# Patient Record
Sex: FEMALE | Race: WHITE | Employment: FULL TIME | URBAN - METROPOLITAN AREA
[De-identification: names, ages, dates, MRNs, and addresses within clinical notes are randomized per-mention and may not be internally consistent; named-entity substitution may affect disease eponyms.]

---

## 2017-01-02 ENCOUNTER — OFFICE VISIT (OUTPATIENT)
Dept: URGENT CARE | Facility: CLINIC | Age: 37
End: 2017-01-02
Payer: COMMERCIAL

## 2017-01-02 VITALS
HEIGHT: 63 IN | WEIGHT: 184 LBS | OXYGEN SATURATION: 98 % | HEART RATE: 66 BPM | BODY MASS INDEX: 32.6 KG/M2 | DIASTOLIC BLOOD PRESSURE: 78 MMHG | TEMPERATURE: 98.2 F | SYSTOLIC BLOOD PRESSURE: 118 MMHG

## 2017-01-02 DIAGNOSIS — R05.9 COUGH: ICD-10-CM

## 2017-01-02 DIAGNOSIS — J40 BRONCHITIS: ICD-10-CM

## 2017-01-02 DIAGNOSIS — J02.9 PHARYNGITIS, UNSPECIFIED ETIOLOGY: ICD-10-CM

## 2017-01-02 LAB
FLUAV+FLUBV AG SPEC QL IA: NORMAL
INT CON NEG: NEGATIVE
INT CON NEG: NEGATIVE
INT CON POS: POSITIVE
INT CON POS: POSITIVE
S PYO AG THROAT QL: NORMAL

## 2017-01-02 PROCEDURE — 87804 INFLUENZA ASSAY W/OPTIC: CPT | Performed by: NURSE PRACTITIONER

## 2017-01-02 PROCEDURE — 99214 OFFICE O/P EST MOD 30 MIN: CPT | Performed by: NURSE PRACTITIONER

## 2017-01-02 PROCEDURE — 87880 STREP A ASSAY W/OPTIC: CPT | Performed by: NURSE PRACTITIONER

## 2017-01-02 RX ORDER — CODEINE PHOSPHATE AND GUAIFENESIN 10; 100 MG/5ML; MG/5ML
5 SOLUTION ORAL EVERY 6 HOURS PRN
Qty: 120 ML | Refills: 0 | Status: SHIPPED | OUTPATIENT
Start: 2017-01-02

## 2017-01-02 RX ORDER — AZITHROMYCIN 250 MG/1
TABLET, FILM COATED ORAL
Qty: 6 TAB | Refills: 0 | Status: SHIPPED | OUTPATIENT
Start: 2017-01-02 | End: 2018-02-10 | Stop reason: CLARIF

## 2017-01-02 ASSESSMENT — ENCOUNTER SYMPTOMS
TROUBLE SWALLOWING: 1
SPUTUM PRODUCTION: 1
SORE THROAT: 1
MYALGIAS: 1
FEVER: 1
SWOLLEN GLANDS: 1
CHILLS: 1
COUGH: 1
GASTROINTESTINAL NEGATIVE: 1

## 2017-01-02 NOTE — Clinical Note
January 2, 2017       Patient: Theresa Caldwell   YOB: 1980   Date of Visit: 1/2/2017         To Whom It May Concern:    It is my medical opinion that Theresa Caldwell may return to work on 1/4/17.    If you have any questions or concerns, please don't hesitate to call [phone]          Sincerely,          Cathey J Hamman, A.P.N.  Electronically Signed

## 2017-01-02 NOTE — MR AVS SNAPSHOT
"        Theresa Starr Javi   2017 9:00 AM   Office Visit   MRN: [id number]    Department:  [facility]   Dept Phone:  [phone]    Description:  Female : 1980   Provider:  Cathey J Hamman, A.P.N.           Reason for Visit     Pharyngitis Sore throat/ear pain/spiting up phlem and some dark red blood x4 days      Allergies as of 2017     No Known Allergies      You were diagnosed with     Bronchitis   [546106]       Pharyngitis, unspecified etiology   [8105253]       Cough   [786.2.ICD-9-CM]         Vital Signs     Blood Pressure Pulse Temperature Height Weight Body Mass Index    118/78 mmHg 66 36.8 °C (98.2 °F) 1.588 m (5' 2.5\") 83.462 kg (184 lb) 33.10 kg/m2    Oxygen Saturation Smoking Status                98% Never Smoker           Basic Information     Date Of Birth Sex Race Ethnicity Preferred Language    1980 Female White Unknown English      Health Maintenance        Date Due Completion Dates    IMM DTaP/Tdap/Td Vaccine (1 - Tdap) 2001 ---    PAP SMEAR 2003 ---    IMM INFLUENZA (1) 2016 ---            Results     POCT Rapid Strep A      Component    Rapid Strep Screen    NEG    Internal Control Positive    Positive    Internal Control Negative    Negative                        Current Immunizations     No immunizations on file.      Below and/or attached are the medications your provider expects you to take. Review all of your home medications and newly ordered medications with your provider and/or pharmacist. Follow medication instructions as directed by your provider and/or pharmacist. Please keep your medication list with you and share with your provider. Update the information when medications are discontinued, doses are changed, or new medications (including over-the-counter products) are added; and carry medication information at all times in the event of emergency situations     Allergies:  No Known Allergies          Medications  Valid as of: 2017 - " 10:25 AM    Generic Name Brand Name Tablet Size Instructions for use    Azithromycin (Tab) ZITHROMAX 250 MG 2 tabs by mouth day 1, 1 tab by mouth days 2-5        Azithromycin (Tab) ZITHROMAX 250 MG As directed with food        Azithromycin (Tab) ZITHROMAX 250 MG Take 2 pills by mouth on day one, take 1 pill by mouth on days 2-5        Cetirizine HCl (Tab) ZYRTEC 10 MG Take 10 mg by mouth every day.        Fluticasone Propionate (Suspension) FLONASE 50 MCG/ACT Spray 2 Sprays in nose every day. Each Nostril        Guaifenesin-Codeine (Solution) ROBITUSSIN -10 mg/5mL Take 5 mL by mouth every 6 hours as needed.        Hydrocod Polst-Chlorphen Polst (Liquid CR) TUSSIONEX 10-8 MG/5ML Take 5 mL by mouth every 12 hours as needed for Cough or Rhinitis.        Hydrocod Polst-Chlorphen Polst (Suspension Extended Release) TUSSIONEX 10-8 MG/5ML Take 5 mL by mouth every 12 hours.        Levonorgestrel-Ethinyl Estrad (Tab) NORDETTE 0.15-30 MG-MCG Take 1 Tab by mouth every day.        RaNITidine HCl (Tab) ZANTAC 150 MG Take 150 mg by mouth 2 times a day.        .                 Medicines prescribed today were sent to:     [facility] #[id number] - [place] - [address]    [address] [place] [address]    Phone: [phone] Fax: [phone]    Open 24 Hours?: No      Medication refill instructions:       If your prescription bottle indicates you have medication refills left, it is not necessary to call your provider’s office. Please contact your pharmacy and they will refill your medication.    If your prescription bottle indicates you do not have any refills left, you may request refills at any time through one of the following ways: The online [employer] system (except Urgent Care), by calling your provider’s office, or by asking your pharmacy to contact your provider’s office with a refill request. Medication refills are processed only during regular business hours and may not be available until the next business  day. Your provider may request additional information or to have a follow-up visit with you prior to refilling your medication.   *Please Note: Medication refills are assigned a new Rx number when refilled electronically. Your pharmacy may indicate that no refills were authorized even though a new prescription for the same medication is available at the pharmacy. Please request the medicine by name with the pharmacy before contacting your provider for a refill.           MyChart Status: Patient Declined

## 2017-01-02 NOTE — PROGRESS NOTES
Subjective:      Theresa Caldwell is a 36 y.o. female who presents with Pharyngitis    Past Medical History   Diagnosis Date   • Urticaria, chronic    • Other specified disorder of intestines      constipation/ irregular     Social History     Social History   • Marital Status: Single     Spouse Name: N/A   • Number of Children: N/A   • Years of Education: N/A     Occupational History   • Not on file.     Social History Main Topics   • Smoking status: Never Smoker    • Smokeless tobacco: Never Used   • Alcohol Use: Yes      Comment: rare   • Drug Use: No   • Sexual Activity:     Partners: Male     Other Topics Concern   • Not on file     Social History Narrative     Family History   Problem Relation Age of Onset   • Lung Disease Maternal Grandmother    • Heart Disease Paternal Grandfather      Allergies: Review of patient's allergies indicates no known allergies.     Current Outpatient Prescriptions on File Prior to Visit   Medication Sig Dispense Refill   • Hydrocod Polst-CPM Polst ER (TUSSIONEX) 10-8 MG/5ML Suspension Extended Release Take 5 mL by mouth every 12 hours. 60 mL 0   • azithromycin (ZITHROMAX) 250 MG Tab As directed with food 6 Tab 0   • hydrocod polst-chlorphen polst (TUSSIONEX) 10-8 MG/5ML LQCR Take 5 mL by mouth every 12 hours as needed for Cough or Rhinitis. 140 mL 0   • azithromycin (ZITHROMAX) 250 MG TABS 2 tabs by mouth day 1, 1 tab by mouth days 2-5 6 Tab 0   • fluticasone (FLONASE) 50 MCG/ACT nasal spray Spray 2 Sprays in nose every day. Each Nostril 1 Bottle 0   • ranitidine (ZANTAC) 150 MG TABS Take 150 mg by mouth 2 times a day.     • cetirizine (ZYRTEC) 10 MG TABS Take 10 mg by mouth every day.     • levonorgestrel-ethinyl estradiol (GINA-28) 0.15-30 MG-MCG per tablet Take 1 Tab by mouth every day.       No current facility-administered medications on file prior to visit.     Past Surgical History   Procedure Laterality Date   • Tonsillectomy and adenoidectomy       as a child   •  "Cervical conization     • Eye surgery  2009     lasik bilat   • Dental extraction(s)       wisdom teeth   • Ventral hernia repair  6/16/2011     Performed by CARIN LEIVA at SURGERY [facility]               Pharyngitis   This is a new problem. Episode onset: 3 days ago  The problem has been gradually worsening. Neither side of throat is experiencing more pain than the other. There has been no fever. The pain is moderate. Associated symptoms include congestion, coughing, swollen glands and trouble swallowing. She has had no exposure to strep or mono. She has tried nothing for the symptoms. The treatment provided no relief.       Review of Systems   Constitutional: Positive for fever, chills and malaise/fatigue.   HENT: Positive for congestion, sore throat and trouble swallowing.    Respiratory: Positive for cough and sputum production.    Gastrointestinal: Negative.    Genitourinary: Negative.    Musculoskeletal: Positive for myalgias.   Skin: Negative.        All other systems reviewed and are negative      Objective:     /78 mmHg  Pulse 66  Temp(Src) 36.8 °C (98.2 °F)  Ht 1.588 m (5' 2.5\")  Wt 83.462 kg (184 lb)  BMI 33.10 kg/m2  SpO2 98%     Physical Exam   Constitutional: She is oriented to person, place, and time. She appears well-developed and well-nourished. No distress.   HENT:   Head: Normocephalic.   Right Ear: External ear normal.   Left Ear: External ear normal.   Oropharynx red    Eyes: Conjunctivae and EOM are normal. Pupils are equal, round, and reactive to light. Right eye exhibits no discharge. Left eye exhibits no discharge.   Neck: Normal range of motion. Neck supple.   Cardiovascular: Normal rate and regular rhythm.    Pulmonary/Chest: Effort normal and breath sounds normal.   Musculoskeletal: Normal range of motion.   Lymphadenopathy:     She has cervical adenopathy.   Neurological: She is alert and oriented to person, place, and time.   Skin: Skin is warm and dry. She is not " diaphoretic.   Psychiatric: She has a normal mood and affect. Her behavior is normal.   Vitals reviewed.    poct strep: negative     poct influenza: negative           Assessment/Plan:   Bronchitis  Pharyngitis  Cough   -zithroamx   -cheratussin   -tylenol/motrin PRN   -follow up ifs ymptoms persist or wrosen   There are no diagnoses linked to this encounter.

## 2018-02-10 ENCOUNTER — OFFICE VISIT (OUTPATIENT)
Dept: URGENT CARE | Facility: CLINIC | Age: 38
End: 2018-02-10

## 2018-02-10 VITALS
BODY MASS INDEX: 30.12 KG/M2 | HEIGHT: 63 IN | WEIGHT: 170 LBS | SYSTOLIC BLOOD PRESSURE: 110 MMHG | OXYGEN SATURATION: 97 % | TEMPERATURE: 98.2 F | DIASTOLIC BLOOD PRESSURE: 80 MMHG | HEART RATE: 82 BPM

## 2018-02-10 DIAGNOSIS — J01.11 ACUTE RECURRENT FRONTAL SINUSITIS: ICD-10-CM

## 2018-02-10 PROCEDURE — 99214 OFFICE O/P EST MOD 30 MIN: CPT | Performed by: PHYSICIAN ASSISTANT

## 2018-02-10 RX ORDER — AZITHROMYCIN 250 MG/1
TABLET, FILM COATED ORAL
Qty: 6 TAB | Refills: 0 | Status: SHIPPED | OUTPATIENT
Start: 2018-02-10

## 2018-02-10 RX ORDER — CODEINE PHOSPHATE AND GUAIFENESIN 10; 100 MG/5ML; MG/5ML
5 SOLUTION ORAL EVERY 4 HOURS PRN
Qty: 120 ML | Refills: 0 | Status: SHIPPED | OUTPATIENT
Start: 2018-02-10 | End: 2018-02-20

## 2018-02-10 ASSESSMENT — ENCOUNTER SYMPTOMS
SPUTUM PRODUCTION: 0
WHEEZING: 0
SORE THROAT: 1
SHORTNESS OF BREATH: 0
COUGH: 1
SINUS PAIN: 1
PALPITATIONS: 0
SINUS PRESSURE: 1
HOARSE VOICE: 1
CHILLS: 0
FEVER: 0
HEADACHES: 1
HEMOPTYSIS: 0

## 2018-02-10 ASSESSMENT — PAIN SCALES - GENERAL: PAINLEVEL: 6=MODERATE PAIN

## 2018-02-10 NOTE — PROGRESS NOTES
Subjective:      Theresa Caldwell is a 37 y.o. female who presents with Cold Symptoms (x5 days. Sinus congestion, sore throat, chest tightness, fever.)            Sinus Problem   This is a new problem. The current episode started in the past 7 days. The problem is unchanged. There has been no fever. Associated symptoms include congestion, coughing, headaches, a hoarse voice, sinus pressure and a sore throat. Pertinent negatives include no chills, ear pain or shortness of breath. Past treatments include oral decongestants. The treatment provided no relief.       Review of Systems   Constitutional: Positive for malaise/fatigue. Negative for chills and fever.   HENT: Positive for congestion, hoarse voice, sinus pain, sinus pressure and sore throat. Negative for ear pain.    Respiratory: Positive for cough. Negative for hemoptysis, sputum production, shortness of breath and wheezing.    Cardiovascular: Negative for chest pain and palpitations.   Neurological: Positive for headaches.   All other systems reviewed and are negative.    PMH:  has a past medical history of Other specified disorder of intestines and Urticaria, chronic.  MEDS:   Current Outpatient Prescriptions:   •  azithromycin (ZITHROMAX) 250 MG Tab, Take 500 mg (two tablets) on day one and then take 250 mg (1 tablet) for 4 days., Disp: 6 Tab, Rfl: 0  •  guaifenesin-codeine (CHERATUSSIN AC) Solution oral solution, Take 5 mL by mouth every four hours as needed for Cough for up to 10 days., Disp: 120 mL, Rfl: 0  •  cetirizine (ZYRTEC) 10 MG TABS, Take 10 mg by mouth every day., Disp: , Rfl:   •  guaifenesin-codeine (CHERATUSSIN AC) Solution oral solution, Take 5 mL by mouth every 6 hours as needed., Disp: 120 mL, Rfl: 0  •  Hydrocod Polst-CPM Polst ER (TUSSIONEX) 10-8 MG/5ML Suspension Extended Release, Take 5 mL by mouth every 12 hours., Disp: 60 mL, Rfl: 0  •  hydrocod polst-chlorphen polst (TUSSIONEX) 10-8 MG/5ML LQCR, Take 5 mL by mouth every 12 hours as  "needed for Cough or Rhinitis., Disp: 140 mL, Rfl: 0  •  fluticasone (FLONASE) 50 MCG/ACT nasal spray, Spray 2 Sprays in nose every day. Each Nostril, Disp: 1 Bottle, Rfl: 0  •  ranitidine (ZANTAC) 150 MG TABS, Take 150 mg by mouth 2 times a day., Disp: , Rfl:   •  levonorgestrel-ethinyl estradiol (GINA-28) 0.15-30 MG-MCG per tablet, Take 1 Tab by mouth every day., Disp: , Rfl:   ALLERGIES: No Known Allergies  SURGHX:   Past Surgical History:   Procedure Laterality Date   • VENTRAL HERNIA REPAIR  6/16/2011    Performed by CARIN LEIVA at SURGERY [facility]   • EYE SURGERY  2009    lasik bilgogo   • CERVICAL CONIZATION     • DENTAL EXTRACTION(S)      wisdom teeth   • TONSILLECTOMY AND ADENOIDECTOMY      as a child     SOCHX:  reports that she has never smoked. She has never used smokeless tobacco. She reports that she drinks alcohol. She reports that she does not use drugs.  FH: Family history was reviewed, no pertinent findings to report  Medications, Allergies, and current problem list reviewed today in Epic     Objective:     /80   Pulse 82   Temp 36.8 °C (98.2 °F)   Ht 1.6 m (5' 3\")   Wt 77.1 kg (170 lb)   LMP 01/20/2018   SpO2 97%   Breastfeeding? No   BMI 30.11 kg/m²      Physical Exam   Constitutional: She is oriented to person, place, and time. Vital signs are normal. She appears well-developed and well-nourished.   HENT:   Head: Normocephalic and atraumatic.   Right Ear: Hearing, tympanic membrane, external ear and ear canal normal.   Left Ear: Hearing, tympanic membrane, external ear and ear canal normal.   Nose: Mucosal edema and rhinorrhea present. No sinus tenderness. No epistaxis. Right sinus exhibits frontal sinus tenderness. Left sinus exhibits frontal sinus tenderness.   Mouth/Throat: Uvula is midline, oropharynx is clear and moist and mucous membranes are normal.   Neck: Normal range of motion. Neck supple.   Cardiovascular: Normal rate, regular rhythm, normal heart sounds and " intact distal pulses.    Pulmonary/Chest: Effort normal and breath sounds normal.   Neurological: She is alert and oriented to person, place, and time.   Skin: Skin is warm and dry.   Psychiatric: She has a normal mood and affect. Her behavior is normal.   Vitals reviewed.              Assessment/Plan:     1. Acute recurrent frontal sinusitis    - azithromycin (ZITHROMAX) 250 MG Tab; Take 500 mg (two tablets) on day one and then take 250 mg (1 tablet) for 4 days.  Dispense: 6 Tab; Refill: 0  - guaifenesin-codeine (CHERATUSSIN AC) Solution oral solution; Take 5 mL by mouth every four hours as needed for Cough for up to 10 days.  Dispense: 120 mL; Refill: 0    Differential diagnosis, natural history, supportive care discussed. Follow-up with primary care provider within 7-10 days, emergency room precautions discussed.  Patient and/or family appears understanding of information.

## 2018-04-14 ENCOUNTER — HOSPITAL ENCOUNTER (OUTPATIENT)
Dept: LAB | Facility: MEDICAL CENTER | Age: 38
End: 2018-04-14
Payer: COMMERCIAL

## 2018-04-14 LAB
25(OH)D3 SERPL-MCNC: 26 NG/ML (ref 30–100)
ALBUMIN SERPL BCP-MCNC: 4.4 G/DL (ref 3.2–4.9)
ALBUMIN/GLOB SERPL: 1.6 G/DL
ALP SERPL-CCNC: 38 U/L (ref 30–99)
ALT SERPL-CCNC: 16 U/L (ref 2–50)
ANION GAP SERPL CALC-SCNC: 5 MMOL/L (ref 0–11.9)
AST SERPL-CCNC: 18 U/L (ref 12–45)
BASOPHILS # BLD AUTO: 0.3 % (ref 0–1.8)
BASOPHILS # BLD: 0.01 K/UL (ref 0–0.12)
BILIRUB SERPL-MCNC: 0.6 MG/DL (ref 0.1–1.5)
BUN SERPL-MCNC: 15 MG/DL (ref 8–22)
CALCIUM SERPL-MCNC: 9.3 MG/DL (ref 8.5–10.5)
CHLORIDE SERPL-SCNC: 108 MMOL/L (ref 96–112)
CHOLEST SERPL-MCNC: 178 MG/DL (ref 100–199)
CO2 SERPL-SCNC: 26 MMOL/L (ref 20–33)
CREAT SERPL-MCNC: 1 MG/DL (ref 0.5–1.4)
CRP SERPL HS-MCNC: 0.5 MG/L (ref 0–7.5)
EOSINOPHIL # BLD AUTO: 0.04 K/UL (ref 0–0.51)
EOSINOPHIL NFR BLD: 1.3 % (ref 0–6.9)
ERYTHROCYTE [DISTWIDTH] IN BLOOD BY AUTOMATED COUNT: 45.2 FL (ref 35.9–50)
EST. AVERAGE GLUCOSE BLD GHB EST-MCNC: 100 MG/DL
FERRITIN SERPL-MCNC: 76.2 NG/ML (ref 10–291)
GLOBULIN SER CALC-MCNC: 2.7 G/DL (ref 1.9–3.5)
GLUCOSE SERPL-MCNC: 92 MG/DL (ref 65–99)
HBA1C MFR BLD: 5.1 % (ref 0–5.6)
HCT VFR BLD AUTO: 43.1 % (ref 37–47)
HDLC SERPL-MCNC: 70 MG/DL
HGB BLD-MCNC: 14.6 G/DL (ref 12–16)
IMM GRANULOCYTES # BLD AUTO: 0 K/UL (ref 0–0.11)
IMM GRANULOCYTES NFR BLD AUTO: 0 % (ref 0–0.9)
LDLC SERPL CALC-MCNC: 96 MG/DL
LYMPHOCYTES # BLD AUTO: 1.39 K/UL (ref 1–4.8)
LYMPHOCYTES NFR BLD: 45.1 % (ref 22–41)
MCH RBC QN AUTO: 31.5 PG (ref 27–33)
MCHC RBC AUTO-ENTMCNC: 33.9 G/DL (ref 33.6–35)
MCV RBC AUTO: 93.1 FL (ref 81.4–97.8)
MONOCYTES # BLD AUTO: 0.24 K/UL (ref 0–0.85)
MONOCYTES NFR BLD AUTO: 7.8 % (ref 0–13.4)
NEUTROPHILS # BLD AUTO: 1.4 K/UL (ref 2–7.15)
NEUTROPHILS NFR BLD: 45.5 % (ref 44–72)
NRBC # BLD AUTO: 0 K/UL
NRBC BLD-RTO: 0 /100 WBC
PLATELET # BLD AUTO: 232 K/UL (ref 164–446)
PMV BLD AUTO: 10 FL (ref 9–12.9)
POTASSIUM SERPL-SCNC: 4.1 MMOL/L (ref 3.6–5.5)
PROT SERPL-MCNC: 7.1 G/DL (ref 6–8.2)
RBC # BLD AUTO: 4.63 M/UL (ref 4.2–5.4)
SODIUM SERPL-SCNC: 139 MMOL/L (ref 135–145)
T3FREE SERPL-MCNC: 2.92 PG/ML (ref 2.4–4.2)
T4 FREE SERPL-MCNC: 0.83 NG/DL (ref 0.53–1.43)
THYROPEROXIDASE AB SERPL-ACNC: 1.9 IU/ML (ref 0–9)
TRIGL SERPL-MCNC: 61 MG/DL (ref 0–149)
TSH SERPL DL<=0.005 MIU/L-ACNC: 1.1 UIU/ML (ref 0.38–5.33)
VIT B12 SERPL-MCNC: 466 PG/ML (ref 211–911)
WBC # BLD AUTO: 3.1 K/UL (ref 4.8–10.8)

## 2018-04-14 PROCEDURE — 85025 COMPLETE CBC W/AUTO DIFF WBC: CPT

## 2018-04-14 PROCEDURE — 80061 LIPID PANEL: CPT

## 2018-04-14 PROCEDURE — 82607 VITAMIN B-12: CPT

## 2018-04-14 PROCEDURE — 86376 MICROSOMAL ANTIBODY EACH: CPT

## 2018-04-14 PROCEDURE — 83036 HEMOGLOBIN GLYCOSYLATED A1C: CPT

## 2018-04-14 PROCEDURE — 36415 COLL VENOUS BLD VENIPUNCTURE: CPT

## 2018-04-14 PROCEDURE — 82306 VITAMIN D 25 HYDROXY: CPT

## 2018-04-14 PROCEDURE — 80053 COMPREHEN METABOLIC PANEL: CPT

## 2018-04-14 PROCEDURE — 82728 ASSAY OF FERRITIN: CPT

## 2018-04-14 PROCEDURE — 84443 ASSAY THYROID STIM HORMONE: CPT

## 2018-04-14 PROCEDURE — 83525 ASSAY OF INSULIN: CPT

## 2018-04-14 PROCEDURE — 86800 THYROGLOBULIN ANTIBODY: CPT

## 2018-04-14 PROCEDURE — 84439 ASSAY OF FREE THYROXINE: CPT

## 2018-04-14 PROCEDURE — 86141 C-REACTIVE PROTEIN HS: CPT

## 2018-04-14 PROCEDURE — 84481 FREE ASSAY (FT-3): CPT

## 2018-04-17 LAB
INSULIN P FAST SERPL-ACNC: 7 UIU/ML (ref 3–19)
THYROGLOB AB SERPL-ACNC: <0.9 IU/ML (ref 0–4)

## 2018-10-18 ENCOUNTER — APPOINTMENT (RX ONLY)
Dept: URBAN - METROPOLITAN AREA CLINIC 35 | Facility: CLINIC | Age: 38
Setting detail: DERMATOLOGY
End: 2018-10-18

## 2018-10-18 DIAGNOSIS — L81.4 OTHER MELANIN HYPERPIGMENTATION: ICD-10-CM

## 2018-10-18 DIAGNOSIS — Z87.2 PERSONAL HISTORY OF DISEASES OF THE SKIN AND SUBCUTANEOUS TISSUE: ICD-10-CM

## 2018-10-18 DIAGNOSIS — Z71.89 OTHER SPECIFIED COUNSELING: ICD-10-CM

## 2018-10-18 DIAGNOSIS — B00.1 HERPESVIRAL VESICULAR DERMATITIS: ICD-10-CM

## 2018-10-18 DIAGNOSIS — D22 MELANOCYTIC NEVI: ICD-10-CM

## 2018-10-18 PROBLEM — D22.71 MELANOCYTIC NEVI OF RIGHT LOWER LIMB, INCLUDING HIP: Status: ACTIVE | Noted: 2018-10-18

## 2018-10-18 PROBLEM — D22.5 MELANOCYTIC NEVI OF TRUNK: Status: ACTIVE | Noted: 2018-10-18

## 2018-10-18 PROCEDURE — 99213 OFFICE O/P EST LOW 20 MIN: CPT

## 2018-10-18 PROCEDURE — ? PRESCRIPTION

## 2018-10-18 PROCEDURE — ? OBSERVATION AND MEASURE

## 2018-10-18 PROCEDURE — ? COUNSELING

## 2018-10-18 RX ORDER — VALACYCLOVIR HYDROCHLORIDE 1 G/1
TABLET, FILM COATED ORAL
Qty: 12 | Refills: 3 | Status: ERX

## 2018-10-18 ASSESSMENT — LOCATION DETAILED DESCRIPTION DERM
LOCATION DETAILED: LEFT MID-UPPER BACK
LOCATION DETAILED: LEFT ANTERIOR PROXIMAL UPPER ARM
LOCATION DETAILED: LEFT SUPERIOR MEDIAL MIDBACK
LOCATION DETAILED: RIGHT LATERAL ABDOMEN
LOCATION DETAILED: LEFT RIB CAGE
LOCATION DETAILED: RIGHT PROXIMAL LATERAL POSTERIOR THIGH

## 2018-10-18 ASSESSMENT — LOCATION SIMPLE DESCRIPTION DERM
LOCATION SIMPLE: LEFT LOWER BACK
LOCATION SIMPLE: LEFT UPPER ARM
LOCATION SIMPLE: LEFT UPPER BACK
LOCATION SIMPLE: ABDOMEN
LOCATION SIMPLE: RIGHT POSTERIOR THIGH

## 2018-10-18 ASSESSMENT — LOCATION ZONE DERM
LOCATION ZONE: ARM
LOCATION ZONE: LEG
LOCATION ZONE: TRUNK

## 2018-10-18 NOTE — HPI: SKIN LESION
Is This A New Presentation, Or A Follow-Up?: Skin Lesion
What Type Of Note Output Would You Prefer (Optional)?: Standard Output
How Severe Is Your Skin Lesion?: mild
Has Your Skin Lesion Been Treated?: not been treated
Which Family Member (Optional)?: Mother and two brothers

## 2018-12-03 ENCOUNTER — HOSPITAL ENCOUNTER (OUTPATIENT)
Dept: LAB | Facility: MEDICAL CENTER | Age: 38
End: 2018-12-03
Payer: COMMERCIAL

## 2018-12-03 LAB
BASOPHILS # BLD AUTO: 0.2 % (ref 0–1.8)
BASOPHILS # BLD: 0.01 K/UL (ref 0–0.12)
EOSINOPHIL # BLD AUTO: 0.03 K/UL (ref 0–0.51)
EOSINOPHIL NFR BLD: 0.7 % (ref 0–6.9)
ERYTHROCYTE [DISTWIDTH] IN BLOOD BY AUTOMATED COUNT: 44.5 FL (ref 35.9–50)
HCT VFR BLD AUTO: 43.1 % (ref 37–47)
HGB BLD-MCNC: 14.2 G/DL (ref 12–16)
IMM GRANULOCYTES # BLD AUTO: 0.01 K/UL (ref 0–0.11)
IMM GRANULOCYTES NFR BLD AUTO: 0.2 % (ref 0–0.9)
LYMPHOCYTES # BLD AUTO: 1.6 K/UL (ref 1–4.8)
LYMPHOCYTES NFR BLD: 38.4 % (ref 22–41)
MCH RBC QN AUTO: 31.3 PG (ref 27–33)
MCHC RBC AUTO-ENTMCNC: 32.9 G/DL (ref 33.6–35)
MCV RBC AUTO: 94.9 FL (ref 81.4–97.8)
MONOCYTES # BLD AUTO: 0.23 K/UL (ref 0–0.85)
MONOCYTES NFR BLD AUTO: 5.5 % (ref 0–13.4)
NEUTROPHILS # BLD AUTO: 2.29 K/UL (ref 2–7.15)
NEUTROPHILS NFR BLD: 55 % (ref 44–72)
NRBC # BLD AUTO: 0 K/UL
NRBC BLD-RTO: 0 /100 WBC
PLATELET # BLD AUTO: 249 K/UL (ref 164–446)
PMV BLD AUTO: 10 FL (ref 9–12.9)
RBC # BLD AUTO: 4.54 M/UL (ref 4.2–5.4)
WBC # BLD AUTO: 4.2 K/UL (ref 4.8–10.8)

## 2018-12-03 PROCEDURE — 80053 COMPREHEN METABOLIC PANEL: CPT

## 2018-12-03 PROCEDURE — 84481 FREE ASSAY (FT-3): CPT

## 2018-12-03 PROCEDURE — 85025 COMPLETE CBC W/AUTO DIFF WBC: CPT

## 2018-12-03 PROCEDURE — 82306 VITAMIN D 25 HYDROXY: CPT

## 2018-12-03 PROCEDURE — 36415 COLL VENOUS BLD VENIPUNCTURE: CPT

## 2018-12-03 PROCEDURE — 84443 ASSAY THYROID STIM HORMONE: CPT

## 2018-12-03 PROCEDURE — 84439 ASSAY OF FREE THYROXINE: CPT

## 2018-12-03 PROCEDURE — 82607 VITAMIN B-12: CPT

## 2018-12-03 PROCEDURE — 83525 ASSAY OF INSULIN: CPT

## 2018-12-04 LAB
25(OH)D3 SERPL-MCNC: 29 NG/ML (ref 30–100)
ALBUMIN SERPL BCP-MCNC: 4.3 G/DL (ref 3.2–4.9)
ALBUMIN/GLOB SERPL: 1.7 G/DL
ALP SERPL-CCNC: 35 U/L (ref 30–99)
ALT SERPL-CCNC: 14 U/L (ref 2–50)
ANION GAP SERPL CALC-SCNC: 5 MMOL/L (ref 0–11.9)
AST SERPL-CCNC: 16 U/L (ref 12–45)
BILIRUB SERPL-MCNC: 0.5 MG/DL (ref 0.1–1.5)
BUN SERPL-MCNC: 12 MG/DL (ref 8–22)
CALCIUM SERPL-MCNC: 9.1 MG/DL (ref 8.5–10.5)
CHLORIDE SERPL-SCNC: 106 MMOL/L (ref 96–112)
CO2 SERPL-SCNC: 26 MMOL/L (ref 20–33)
CREAT SERPL-MCNC: 1.01 MG/DL (ref 0.5–1.4)
FASTING STATUS PATIENT QL REPORTED: NORMAL
GLOBULIN SER CALC-MCNC: 2.5 G/DL (ref 1.9–3.5)
GLUCOSE SERPL-MCNC: 88 MG/DL (ref 65–99)
POTASSIUM SERPL-SCNC: 4.1 MMOL/L (ref 3.6–5.5)
PROT SERPL-MCNC: 6.8 G/DL (ref 6–8.2)
SODIUM SERPL-SCNC: 137 MMOL/L (ref 135–145)
T3FREE SERPL-MCNC: 3.26 PG/ML (ref 2.4–4.2)
T4 FREE SERPL-MCNC: 0.93 NG/DL (ref 0.53–1.43)
TSH SERPL DL<=0.005 MIU/L-ACNC: 0.95 UIU/ML (ref 0.38–5.33)
VIT B12 SERPL-MCNC: >1500 PG/ML (ref 211–911)

## 2018-12-05 LAB — INSULIN P FAST SERPL-ACNC: 10 UIU/ML (ref 3–19)

## 2018-12-12 ENCOUNTER — HOSPITAL ENCOUNTER (OUTPATIENT)
Dept: LAB | Facility: MEDICAL CENTER | Age: 38
End: 2018-12-12
Attending: ANESTHESIOLOGY
Payer: COMMERCIAL

## 2018-12-12 PROCEDURE — 36415 COLL VENOUS BLD VENIPUNCTURE: CPT

## 2018-12-12 PROCEDURE — 84703 CHORIONIC GONADOTROPIN ASSAY: CPT

## 2018-12-13 LAB — HCG SERPL QL: NEGATIVE

## 2019-01-19 ENCOUNTER — OFFICE VISIT (OUTPATIENT)
Dept: URGENT CARE | Facility: CLINIC | Age: 39
End: 2019-01-19
Payer: COMMERCIAL

## 2019-01-19 VITALS
WEIGHT: 160 LBS | BODY MASS INDEX: 28.35 KG/M2 | HEIGHT: 63 IN | OXYGEN SATURATION: 95 % | DIASTOLIC BLOOD PRESSURE: 80 MMHG | TEMPERATURE: 97.7 F | SYSTOLIC BLOOD PRESSURE: 110 MMHG | HEART RATE: 92 BPM

## 2019-01-19 DIAGNOSIS — J01.90 ACUTE RHINOSINUSITIS: ICD-10-CM

## 2019-01-19 PROCEDURE — 99214 OFFICE O/P EST MOD 30 MIN: CPT | Performed by: EMERGENCY MEDICINE

## 2019-01-19 RX ORDER — DOXYCYCLINE HYCLATE 100 MG
100 TABLET ORAL 2 TIMES DAILY
Qty: 14 TAB | Refills: 0 | Status: SHIPPED | OUTPATIENT
Start: 2019-01-19

## 2019-01-19 RX ORDER — OXYCODONE HYDROCHLORIDE AND ACETAMINOPHEN 5; 325 MG/1; MG/1
TABLET ORAL
Refills: 0 | COMMUNITY
Start: 2018-12-14

## 2019-01-19 ASSESSMENT — ENCOUNTER SYMPTOMS
WHEEZING: 0
SINUS PAIN: 1
SINUS PRESSURE: 1
COUGH: 1
SORE THROAT: 1
SHORTNESS OF BREATH: 0
VOMITING: 0
DIARRHEA: 0

## 2019-01-19 NOTE — PATIENT INSTRUCTIONS
Use over-the-counter oxymetazoline (Afrin) nasal spray as needed for up to 3 days only; follow package instructions for dosing.  Consider use of over-the-counter dextromethorphan (Benylin DM) 10-30 mg every 4-8 hours as needed for cough relief.  Use an oral probiotic daily, such as Culturelle, Align, or yogurt to reduce gastrointestinal symptoms from antibiotic use.  Bronchitis  Bronchitis is a problem of the air tubes leading to your lungs. This problem makes it hard for air to get in and out of the lungs. You may cough a lot because your air tubes are narrow. Going without care can cause lasting (chronic) bronchitis.  HOME CARE   · Drink enough fluids to keep your pee (urine) clear or pale yellow.  · Use a cool mist humidifier.  · Quit smoking if you smoke. If you keep smoking, the bronchitis might not get better.  · Only take medicine as told by your doctor.  GET HELP RIGHT AWAY IF:   · Coughing keeps you awake.  · You start to wheeze.  · You become more sick or weak.  · You have a hard time breathing or get short of breath.  · You cough up blood.  · Coughing lasts more than 2 weeks.  · You have a fever.  · Your baby is older than 3 months with a rectal temperature of 102° F (38.9° C) or higher.  · Your baby is 3 months old or younger with a rectal temperature of 100.4° F (38° C) or higher.  MAKE SURE YOU:  · Understand these instructions.  · Will watch your condition.  · Will get help right away if you are not doing well or get worse.  Document Released: 06/05/2009 Document Revised: 03/11/2013 Document Reviewed: 11/19/2010  ExitCare® Patient Information ©2014 [employer].  Sinusitis, Adult  Sinusitis is soreness and inflammation of your sinuses. Sinuses are hollow spaces in the bones around your face. They are located:  · Around your eyes.  · In the middle of your forehead.  · Behind your nose.  · In your cheekbones.  Your sinuses and nasal passages are lined with a stringy fluid (mucus). Mucus normally drains  out of your sinuses. When your nasal tissues get inflamed or swollen, the mucus can get trapped or blocked so air cannot flow through your sinuses. This lets bacteria, viruses, and funguses grow, and that leads to infection.  Follow these instructions at home:  Medicines  · Take, use, or apply over-the-counter and prescription medicines only as told by your doctor. These may include nasal sprays.  · If you were prescribed an antibiotic medicine, take it as told by your doctor. Do not stop taking the antibiotic even if you start to feel better.  Hydrate and Humidify  · Drink enough water to keep your pee (urine) clear or pale yellow.  · Use a cool mist humidifier to keep the humidity level in your home above 50%.  · Breathe in steam for 10-15 minutes, 3-4 times a day or as told by your doctor. You can do this in the bathroom while a hot shower is running.  · Try not to spend time in cool or dry air.  Rest  · Rest as much as possible.  · Sleep with your head raised (elevated).  · Make sure to get enough sleep each night.  General instructions  · Put a warm, moist washcloth on your face 3-4 times a day or as told by your doctor. This will help with discomfort.  · Wash your hands often with soap and water. If there is no soap and water, use hand .  · Do not smoke. Avoid being around people who are smoking (secondhand smoke).  · Keep all follow-up visits as told by your doctor. This is important.  Contact a doctor if:  · You have a fever.  · Your symptoms get worse.  · Your symptoms do not get better within 10 days.  Get help right away if:  · You have a very bad headache.  · You cannot stop throwing up (vomiting).  · You have pain or swelling around your face or eyes.  · You have trouble seeing.  · You feel confused.  · Your neck is stiff.  · You have trouble breathing.  This information is not intended to replace advice given to you by your health care provider. Make sure you discuss any questions you have  with your health care provider.  Document Released: 06/05/2009 Document Revised: 08/13/2017 Document Reviewed: 10/12/2016  Elsevier Interactive Patient Education © 2017 [employer]

## 2019-01-19 NOTE — PROGRESS NOTES
Subjective:      Theresa Caldwell is a 38 y.o. female who presents with Cold Symptoms (x1 week. Sore throat, headache, sinus pressure/congestion, cough, chest congestion.)            Sinusitis   This is a new problem. Episode onset: one week. The problem has been rapidly worsening since onset. There has been no fever. The pain is moderate. Associated symptoms include congestion, coughing, ear pain, sinus pressure and a sore throat. Pertinent negatives include no shortness of breath. Past treatments include oral decongestants. The treatment provided mild relief.       Review of Systems   Constitutional: Positive for malaise/fatigue.   HENT: Positive for congestion, ear pain, sinus pain, sinus pressure and sore throat. Negative for ear discharge, hearing loss and nosebleeds.         Purulent nasal discharge noted.   Respiratory: Positive for cough. Negative for shortness of breath and wheezing.    Gastrointestinal: Negative for diarrhea and vomiting.   Skin: Negative for rash.   Endo/Heme/Allergies: Positive for environmental allergies.     PMH:  has a past medical history of Other specified disorder of intestines and Urticaria, chronic.  MEDS:   Current Outpatient Prescriptions:   •  doxycycline (VIBRAMYCIN) 100 MG Tab, Take 1 Tab by mouth 2 times a day., Disp: 14 Tab, Rfl: 0  •  cetirizine (ZYRTEC) 10 MG TABS, Take 10 mg by mouth every day., Disp: , Rfl:   •  oxyCODONE-acetaminophen (PERCOCET) 5-325 MG Tab, TAKE 1 TABLET BY MOUTH EVERY 4 HOURS AS NEEDED FOR PAIN FOR 5 DAYS, Disp: , Rfl: 0  •  azithromycin (ZITHROMAX) 250 MG Tab, Take 500 mg (two tablets) on day one and then take 250 mg (1 tablet) for 4 days., Disp: 6 Tab, Rfl: 0  •  guaifenesin-codeine (CHERATUSSIN AC) Solution oral solution, Take 5 mL by mouth every 6 hours as needed., Disp: 120 mL, Rfl: 0  •  Hydrocod Polst-CPM Polst ER (TUSSIONEX) 10-8 MG/5ML Suspension Extended Release, Take 5 mL by mouth every 12 hours., Disp: 60 mL, Rfl: 0  •  hydrocod  "polst-chlorphen polst (TUSSIONEX) 10-8 MG/5ML LQCR, Take 5 mL by mouth every 12 hours as needed for Cough or Rhinitis., Disp: 140 mL, Rfl: 0  •  fluticasone (FLONASE) 50 MCG/ACT nasal spray, Spray 2 Sprays in nose every day. Each Nostril, Disp: 1 Bottle, Rfl: 0  •  ranitidine (ZANTAC) 150 MG TABS, Take 150 mg by mouth 2 times a day., Disp: , Rfl:   •  levonorgestrel-ethinyl estradiol (GINA-28) 0.15-30 MG-MCG per tablet, Take 1 Tab by mouth every day., Disp: , Rfl:   ALLERGIES: No Known Allergies  SURGHX:   Past Surgical History:   Procedure Laterality Date   • VENTRAL HERNIA REPAIR  6/16/2011    Performed by CARIN LEIVA at SURGERY [facility]S   • EYE SURGERY  2009    lasik bilat   • CERVICAL CONIZATION     • DENTAL EXTRACTION(S)      wisdom teeth   • TONSILLECTOMY AND ADENOIDECTOMY      as a child     SOCHX:  reports that she has never smoked. She has never used smokeless tobacco. She reports that she drinks alcohol. She reports that she does not use drugs.  FH: family history includes Heart Disease in her paternal grandfather; Lung Disease in her maternal grandmother.       Objective:     /80   Pulse 92   Temp 36.5 °C (97.7 °F)   Ht 1.6 m (5' 3\")   Wt 72.6 kg (160 lb)   SpO2 95%   BMI 28.34 kg/m²      Physical Exam   Constitutional: She appears well-developed and well-nourished. She is cooperative. She does not appear ill. No distress.   HENT:   Right Ear: Tympanic membrane and ear canal normal.   Left Ear: Tympanic membrane and ear canal normal.   Nose: Mucosal edema present. No rhinorrhea. Right sinus exhibits maxillary sinus tenderness. Left sinus exhibits maxillary sinus tenderness.   Mouth/Throat: Uvula is midline and oropharynx is clear and moist.   Eyes: Conjunctivae are normal.   Neck: Trachea normal. Neck supple.   Cardiovascular: Normal rate, regular rhythm and normal heart sounds.    Pulmonary/Chest: Effort normal. She has no decreased breath sounds. She has no wheezes. She has no " rhonchi. She has no rales.   Lymphadenopathy:     She has no cervical adenopathy.   Neurological: She is alert.   Skin: Skin is warm and dry.   Psychiatric: She has a normal mood and affect.               Assessment/Plan:     1. Acute rhinosinusitis  Recommended supportive care measures, including rest, increasing oral fluid intake and use of over-the-counter medications for relief of symptoms.  Due to duration, worsening:  - doxycycline (VIBRAMYCIN) 100 MG Tab; Take 1 Tab by mouth 2 times a day.  Dispense: 14 Tab; Refill: 0

## 2019-04-10 ENCOUNTER — RX ONLY (OUTPATIENT)
Age: 39
Setting detail: RX ONLY
End: 2019-04-10

## 2019-04-10 RX ORDER — CETIRIZINE HYDROCHLORIDE 10 MG/1
1 TABLET, FILM COATED ORAL BID
Qty: 60 | Refills: 11 | Status: CANCELLED
Stop reason: CLARIF

## 2019-09-27 ENCOUNTER — HOSPITAL ENCOUNTER (OUTPATIENT)
Dept: LAB | Facility: MEDICAL CENTER | Age: 39
End: 2019-09-27
Payer: COMMERCIAL

## 2019-09-27 LAB
25(OH)D3 SERPL-MCNC: 25 NG/ML (ref 30–100)
ALBUMIN SERPL BCP-MCNC: 4.7 G/DL (ref 3.2–4.9)
ALBUMIN/GLOB SERPL: 2 G/DL
ALP SERPL-CCNC: 34 U/L (ref 30–99)
ALT SERPL-CCNC: 15 U/L (ref 2–50)
ANION GAP SERPL CALC-SCNC: 8 MMOL/L (ref 0–11.9)
AST SERPL-CCNC: 20 U/L (ref 12–45)
BASOPHILS # BLD AUTO: 0.3 % (ref 0–1.8)
BASOPHILS # BLD: 0.01 K/UL (ref 0–0.12)
BILIRUB SERPL-MCNC: 0.7 MG/DL (ref 0.1–1.5)
BUN SERPL-MCNC: 18 MG/DL (ref 8–22)
CALCIUM SERPL-MCNC: 9 MG/DL (ref 8.5–10.5)
CHLORIDE SERPL-SCNC: 107 MMOL/L (ref 96–112)
CO2 SERPL-SCNC: 24 MMOL/L (ref 20–33)
CREAT SERPL-MCNC: 0.96 MG/DL (ref 0.5–1.4)
EOSINOPHIL # BLD AUTO: 0.04 K/UL (ref 0–0.51)
EOSINOPHIL NFR BLD: 1.2 % (ref 0–6.9)
ERYTHROCYTE [DISTWIDTH] IN BLOOD BY AUTOMATED COUNT: 41.8 FL (ref 35.9–50)
FASTING STATUS PATIENT QL REPORTED: NORMAL
FERRITIN SERPL-MCNC: 100.8 NG/ML (ref 10–291)
GLOBULIN SER CALC-MCNC: 2.4 G/DL (ref 1.9–3.5)
GLUCOSE SERPL-MCNC: 87 MG/DL (ref 65–99)
HCT VFR BLD AUTO: 41.4 % (ref 37–47)
HGB BLD-MCNC: 14.2 G/DL (ref 12–16)
IMM GRANULOCYTES # BLD AUTO: 0.01 K/UL (ref 0–0.11)
IMM GRANULOCYTES NFR BLD AUTO: 0.3 % (ref 0–0.9)
LYMPHOCYTES # BLD AUTO: 1.51 K/UL (ref 1–4.8)
LYMPHOCYTES NFR BLD: 46.6 % (ref 22–41)
MCH RBC QN AUTO: 31.8 PG (ref 27–33)
MCHC RBC AUTO-ENTMCNC: 34.3 G/DL (ref 33.6–35)
MCV RBC AUTO: 92.6 FL (ref 81.4–97.8)
MONOCYTES # BLD AUTO: 0.21 K/UL (ref 0–0.85)
MONOCYTES NFR BLD AUTO: 6.5 % (ref 0–13.4)
NEUTROPHILS # BLD AUTO: 1.46 K/UL (ref 2–7.15)
NEUTROPHILS NFR BLD: 45.1 % (ref 44–72)
NRBC # BLD AUTO: 0 K/UL
NRBC BLD-RTO: 0 /100 WBC
PLATELET # BLD AUTO: 232 K/UL (ref 164–446)
PMV BLD AUTO: 9.9 FL (ref 9–12.9)
POTASSIUM SERPL-SCNC: 3.8 MMOL/L (ref 3.6–5.5)
PROT SERPL-MCNC: 7.1 G/DL (ref 6–8.2)
RBC # BLD AUTO: 4.47 M/UL (ref 4.2–5.4)
SODIUM SERPL-SCNC: 139 MMOL/L (ref 135–145)
T3FREE SERPL-MCNC: 2.4 PG/ML (ref 2.4–4.2)
T4 FREE SERPL-MCNC: 0.85 NG/DL (ref 0.53–1.43)
TSH SERPL DL<=0.005 MIU/L-ACNC: 1.11 UIU/ML (ref 0.38–5.33)
VIT B12 SERPL-MCNC: >1500 PG/ML (ref 211–911)
WBC # BLD AUTO: 3.2 K/UL (ref 4.8–10.8)

## 2019-09-27 PROCEDURE — 85025 COMPLETE CBC W/AUTO DIFF WBC: CPT

## 2019-09-27 PROCEDURE — 80053 COMPREHEN METABOLIC PANEL: CPT

## 2019-09-27 PROCEDURE — 82607 VITAMIN B-12: CPT

## 2019-09-27 PROCEDURE — 36415 COLL VENOUS BLD VENIPUNCTURE: CPT

## 2019-09-27 PROCEDURE — 84481 FREE ASSAY (FT-3): CPT

## 2019-09-27 PROCEDURE — 84443 ASSAY THYROID STIM HORMONE: CPT

## 2019-09-27 PROCEDURE — 84439 ASSAY OF FREE THYROXINE: CPT

## 2019-09-27 PROCEDURE — 82728 ASSAY OF FERRITIN: CPT

## 2019-09-27 PROCEDURE — 82306 VITAMIN D 25 HYDROXY: CPT

## 2019-10-24 ENCOUNTER — APPOINTMENT (RX ONLY)
Dept: URBAN - METROPOLITAN AREA CLINIC 35 | Facility: CLINIC | Age: 39
Setting detail: DERMATOLOGY
End: 2019-10-24

## 2019-10-24 DIAGNOSIS — D22 MELANOCYTIC NEVI: ICD-10-CM

## 2019-10-24 DIAGNOSIS — L81.4 OTHER MELANIN HYPERPIGMENTATION: ICD-10-CM

## 2019-10-24 DIAGNOSIS — D485 NEOPLASM OF UNCERTAIN BEHAVIOR OF SKIN: ICD-10-CM

## 2019-10-24 DIAGNOSIS — L82.1 OTHER SEBORRHEIC KERATOSIS: ICD-10-CM

## 2019-10-24 DIAGNOSIS — Z71.89 OTHER SPECIFIED COUNSELING: ICD-10-CM

## 2019-10-24 DIAGNOSIS — Z87.2 PERSONAL HISTORY OF DISEASES OF THE SKIN AND SUBCUTANEOUS TISSUE: ICD-10-CM

## 2019-10-24 PROBLEM — D22.5 MELANOCYTIC NEVI OF TRUNK: Status: ACTIVE | Noted: 2019-10-24

## 2019-10-24 PROBLEM — D48.5 NEOPLASM OF UNCERTAIN BEHAVIOR OF SKIN: Status: ACTIVE | Noted: 2019-10-24

## 2019-10-24 PROCEDURE — ? BIOPSY BY SHAVE METHOD

## 2019-10-24 PROCEDURE — 99213 OFFICE O/P EST LOW 20 MIN: CPT | Mod: 25

## 2019-10-24 PROCEDURE — ? BIOPSY BY PUNCH METHOD

## 2019-10-24 PROCEDURE — ? COUNSELING

## 2019-10-24 PROCEDURE — 11103 TANGNTL BX SKIN EA SEP/ADDL: CPT

## 2019-10-24 PROCEDURE — 11104 PUNCH BX SKIN SINGLE LESION: CPT

## 2019-10-24 ASSESSMENT — LOCATION ZONE DERM
LOCATION ZONE: TRUNK
LOCATION ZONE: LEG
LOCATION ZONE: ARM

## 2019-10-24 ASSESSMENT — LOCATION DETAILED DESCRIPTION DERM
LOCATION DETAILED: RIGHT POSTERIOR LATERAL PROXIMAL THIGH
LOCATION DETAILED: LEFT SUPERIOR FLANK
LOCATION DETAILED: RIGHT POSTERIOR SHOULDER
LOCATION DETAILED: RIGHT LATERAL ABDOMEN
LOCATION DETAILED: LEFT RIB CAGE
LOCATION DETAILED: RIGHT SUPERIOR LATERAL UPPER BACK

## 2019-10-24 ASSESSMENT — LOCATION SIMPLE DESCRIPTION DERM
LOCATION SIMPLE: RIGHT SHOULDER
LOCATION SIMPLE: RIGHT BACK
LOCATION SIMPLE: ABDOMEN
LOCATION SIMPLE: RIGHT THIGH

## 2019-10-24 NOTE — PROCEDURE: BIOPSY BY PUNCH METHOD
X Depth Of Punch In Cm (Optional): 0
Render Post-Care Instructions In Note?: yes
Biopsy Type: H and E
Wound Care: Petrolatum
Render In Bullet Format When Appropriate: No
Hemostasis: None
Notification Instructions: Patient will be notified of biopsy results. However, patient instructed to call the office if not contacted within 2 weeks.
Consent: Written consent was obtained and risks were reviewed including but not limited to scarring, infection, bleeding, scabbing, incomplete removal, nerve damage and allergy to anesthesia.
Suture Removal: 14 days
Home Suture Removal Text: Patient will remove their sutures at home.  If they have any questions or difficulties they will call the office.
Size Of Lesion In Cm (Optional): 0.5
Detail Level: Detailed
Anesthesia Type: 0.5% lidocaine with 1:200,000 epinephrine and a 1:10 solution of 8.4% sodium bicarbonate
Lab Facility: 
Punch Size In Mm: 6
Post-Care Instructions: I reviewed with the patient in detail post-care instructions. Patient is to keep the biopsy site dry overnight, and then change the bandage and apply petrolatum once daily until sutures are removed.  Use a clean q-tip to apply the petrolatum and avoid touching the biopsy site with your hands.  Avoid immersing in water such as bathtub or swimming pools. Any concerns about a wound infection come into the office for a walk in visit Monday through Friday 8:30 am to 12 pm or 1 pm to 4:30 pm Signs of infection include increasing pain, redness, and drainage from biopsy site.  If we are not in the office, please call through the answering service.
Dressing: bandage
Lab: [id number]
Anesthesia Volume In Cc: 0.6
Epidermal Sutures: 4-0 Nylon
Billing Type: Third-Party Bill

## 2019-10-24 NOTE — PROCEDURE: BIOPSY BY SHAVE METHOD
Consent: Written consent was obtained and risks were reviewed including but not limited to scarring, infection, bleeding, scabbing, incomplete removal, nerve damage and allergy to anesthesia.
Type Of Destruction Used: Curettage
Was A Bandage Applied: Yes
Electrodesiccation And Curettage Text: The wound bed was treated with electrodesiccation and curettage after the biopsy was performed.
Anesthesia Volume In Cc: 0.5
Dressing: bandage
Detail Level: Detailed
Biopsy Method: double edge Personna blade
Bill 36761 For Specimen Handling/Conveyance To Laboratory?: no
Hemostasis: Aluminum Chloride
Silver Nitrate Text: The wound bed was treated with silver nitrate after the biopsy was performed.
Billing Type: Third-Party Bill
Curettage Text: The wound bed was treated with curettage after the biopsy was performed.
Size Of Lesion In Cm: 0.8
Lab: [id number]
Cryotherapy Text: The wound bed was treated with cryotherapy after the biopsy was performed.
Depth Of Biopsy: dermis
Post-Care Instructions: I reviewed with the patient in detail post-care instructions. Keep the biopsy site dry overnight, and then change the dressing once daily and apply a thin layer of petrolatum with a clean q-tip. \\nShowers are OK after 24 hours.  Allow clean water to run over the area.  Do not touch the biopsy site with your hands.  Do not immerse the biopsy site in water such as going into a swimming pool or bathtub until the sutures are removed.  If the biopsy site gets more painful with time, red, or drains, this is concerning for infection.  If you have signs of infection please call the office to come in for a walk in visit Monday through Friday 8:30 am to 12 pm or 1 pm to 4:30 pm.  If we are not in the office, please call through the answering service.
Anesthesia Type: 0.5% lidocaine with 1:200,000 epinephrine and a 1:10 solution of 8.4% sodium bicarbonate
Lab Facility: 
Additional Anesthesia Volume In Cc (Will Not Render If 0): 0
Wound Care: Petrolatum
Notification Instructions: Patient will be notified of biopsy results. However, patient instructed to call the office if not contacted within 2 weeks.
Biopsy Type: H and E
Electrodesiccation Text: The wound bed was treated with electrodesiccation after the biopsy was performed.

## 2019-11-05 ENCOUNTER — APPOINTMENT (RX ONLY)
Dept: URBAN - METROPOLITAN AREA CLINIC 35 | Facility: CLINIC | Age: 39
Setting detail: DERMATOLOGY
End: 2019-11-05

## 2019-11-05 DIAGNOSIS — Z48.02 ENCOUNTER FOR REMOVAL OF SUTURES: ICD-10-CM

## 2019-11-05 PROCEDURE — ? SUTURE REMOVAL (GLOBAL PERIOD)

## 2019-11-05 ASSESSMENT — LOCATION SIMPLE DESCRIPTION DERM: LOCATION SIMPLE: RIGHT THIGH

## 2019-11-05 ASSESSMENT — LOCATION ZONE DERM: LOCATION ZONE: LEG

## 2019-11-05 ASSESSMENT — LOCATION DETAILED DESCRIPTION DERM: LOCATION DETAILED: RIGHT ANTERIOR LATERAL PROXIMAL THIGH

## 2019-11-05 NOTE — PROCEDURE: SUTURE REMOVAL (GLOBAL PERIOD)
Detail Level: Detailed
Add 62487 Cpt? (Important Note: In 2017 The Use Of 90997 Is Being Tracked By Cms To Determine Future Global Period Reimbursement For Global Periods): no

## 2019-12-03 ENCOUNTER — APPOINTMENT (RX ONLY)
Dept: URBAN - METROPOLITAN AREA CLINIC 35 | Facility: CLINIC | Age: 39
Setting detail: DERMATOLOGY
End: 2019-12-03

## 2019-12-03 DIAGNOSIS — D22 MELANOCYTIC NEVI: ICD-10-CM

## 2019-12-03 PROBLEM — D22.5 MELANOCYTIC NEVI OF TRUNK: Status: ACTIVE | Noted: 2019-12-03

## 2019-12-03 PROCEDURE — 11602 EXC TR-EXT MAL+MARG 1.1-2 CM: CPT

## 2019-12-03 PROCEDURE — 12032 INTMD RPR S/A/T/EXT 2.6-7.5: CPT

## 2019-12-03 PROCEDURE — ? EXCISION

## 2019-12-03 ASSESSMENT — LOCATION ZONE DERM: LOCATION ZONE: TRUNK

## 2019-12-03 ASSESSMENT — LOCATION DETAILED DESCRIPTION DERM: LOCATION DETAILED: LEFT SUPERIOR FLANK

## 2019-12-03 ASSESSMENT — LOCATION SIMPLE DESCRIPTION DERM: LOCATION SIMPLE: ABDOMEN

## 2019-12-03 NOTE — PROCEDURE: EXCISION
Complex Repair And W Plasty Text: The defect edges were debeveled with a #15 scalpel blade.  The primary defect was closed partially with a complex linear closure.  Given the location of the remaining defect, shape of the defect and the proximity to free margins a W plasty was deemed most appropriate for complete closure of the defect.  Using a sterile surgical marker, an appropriate advancement flap was drawn incorporating the defect and placing the expected incisions within the relaxed skin tension lines where possible.    The area thus outlined was incised deep to adipose tissue with a #15 scalpel blade.  The skin margins were undermined to an appropriate distance in all directions utilizing iris scissors.
Complex Repair And A-T Advancement Flap Text: The defect edges were debeveled with a #15 scalpel blade.  The primary defect was closed partially with a complex linear closure.  Given the location of the remaining defect, shape of the defect and the proximity to free margins an A-T advancement flap was deemed most appropriate for complete closure of the defect.  Using a sterile surgical marker, an appropriate advancement flap was drawn incorporating the defect and placing the expected incisions within the relaxed skin tension lines where possible.    The area thus outlined was incised deep to adipose tissue with a #15 scalpel blade.  The skin margins were undermined to an appropriate distance in all directions utilizing iris scissors.
Anesthesia Type: 0.5% lidocaine with 1:200,000 epinephrine and a 1:10 solution of 8.4% sodium bicarbonate
Skin Substitute Text: The defect edges were debeveled with a #15 scalpel blade.  Given the location of the defect, shape of the defect and the proximity to free margins a skin substitute graft was deemed most appropriate.  The graft material was trimmed to fit the size of the defect. The graft was then placed in the primary defect and oriented appropriately.
Advancement Flap (Double) Text: The defect edges were debeveled with a #15 scalpel blade.  Given the location of the defect and the proximity to free margins a double advancement flap was deemed most appropriate.  Using a sterile surgical marker, the appropriate advancement flaps were drawn incorporating the defect and placing the expected incisions within the relaxed skin tension lines where possible.    The area thus outlined was incised deep to adipose tissue with a #15 scalpel blade.  The skin margins were undermined to an appropriate distance in all directions utilizing iris scissors.
Transposition Flap Text: The defect edges were debeveled with a #15 scalpel blade.  Given the location of the defect and the proximity to free margins a transposition flap was deemed most appropriate.  Using a sterile surgical marker, an appropriate transposition flap was drawn incorporating the defect.    The area thus outlined was incised deep to adipose tissue with a #15 scalpel blade.  The skin margins were undermined to an appropriate distance in all directions utilizing iris scissors.
Modified Advancement Flap Text: The defect edges were debeveled with a #15 scalpel blade.  Given the location of the defect, shape of the defect and the proximity to free margins a modified advancement flap was deemed most appropriate.  Using a sterile surgical marker, an appropriate advancement flap was drawn incorporating the defect and placing the expected incisions within the relaxed skin tension lines where possible.    The area thus outlined was incised deep to adipose tissue with a #15 scalpel blade.  The skin margins were undermined to an appropriate distance in all directions utilizing iris scissors.
Ear Star Wedge Flap Text: The defect edges were debeveled with a #15 blade scalpel.  Given the location of the defect and the proximity to free margins (helical rim) an ear star wedge flap was deemed most appropriate.  Using a sterile surgical marker, the appropriate flap was drawn incorporating the defect and placing the expected incisions between the helical rim and antihelix where possible.  The area thus outlined was incised through and through with a #15 scalpel blade.
Detail Level: Detailed
Biopsy Photograph Reviewed: Yes
Complex Repair And Double M Plasty Text: The defect edges were debeveled with a #15 scalpel blade.  The primary defect was closed partially with a complex linear closure.  Given the location of the remaining defect, shape of the defect and the proximity to free margins a double M plasty was deemed most appropriate for complete closure of the defect.  Using a sterile surgical marker, an appropriate advancement flap was drawn incorporating the defect and placing the expected incisions within the relaxed skin tension lines where possible.    The area thus outlined was incised deep to adipose tissue with a #15 scalpel blade.  The skin margins were undermined to an appropriate distance in all directions utilizing iris scissors.
Complex Repair And O-L Flap Text: The defect edges were debeveled with a #15 scalpel blade.  The primary defect was closed partially with a complex linear closure.  Given the location of the remaining defect, shape of the defect and the proximity to free margins an O-L flap was deemed most appropriate for complete closure of the defect.  Using a sterile surgical marker, an appropriate flap was drawn incorporating the defect and placing the expected incisions within the relaxed skin tension lines where possible.    The area thus outlined was incised deep to adipose tissue with a #15 scalpel blade.  The skin margins were undermined to an appropriate distance in all directions utilizing iris scissors.
Saucerization Excision Additional Text (Leave Blank If You Do Not Want): The margin was drawn around the clinically apparent lesion.  Incisions were then made along these lines, in a tangential fashion, to the appropriate tissue plane and the lesion was extirpated.
Fusiform Excision Additional Text (Leave Blank If You Do Not Want): The margin was drawn around the clinically apparent lesion.  A fusiform shape was then drawn on the skin incorporating the lesion and margins.  Incisions were then made along these lines to the appropriate tissue plane and the lesion was extirpated.
Purse String (Intermediate) Text: Given the location of the defect and the characteristics of the surrounding skin a purse string intermediate closure was deemed most appropriate.  Undermining was performed circumfirentially around the surgical defect.  A purse string suture was then placed and tightened.
Render Path Notes In Note?: no
Paramedian Forehead Flap Text: A decision was made to reconstruct the defect utilizing an interpolation axial flap and a staged reconstruction.  A telfa template was made of the defect.  This telfa template was then used to outline the paramedian forehead pedicle flap.  The donor area for the pedicle flap was then injected with anesthesia.  The flap was excised through the skin and subcutaneous tissue down to the layer of the underlying musculature.  The pedicle flap was carefully excised within this deep plane to maintain its blood supply.  The edges of the donor site were undermined.   The donor site was closed in a primary fashion.  The pedicle was then rotated into position and sutured.  Once the tube was sutured into place, adequate blood supply was confirmed with blanching and refill.  The pedicle was then wrapped with xeroform gauze and dressed appropriately with a telfa and gauze bandage to ensure continued blood supply and protect the attached pedicle.
Additional Anesthesia Volume In Cc: 0
V-Y Plasty Text: The defect edges were debeveled with a #15 scalpel blade.  Given the location of the defect, shape of the defect and the proximity to free margins an V-Y advancement flap was deemed most appropriate.  Using a sterile surgical marker, an appropriate advancement flap was drawn incorporating the defect and placing the expected incisions within the relaxed skin tension lines where possible.    The area thus outlined was incised deep to adipose tissue with a #15 scalpel blade.  The skin margins were undermined to an appropriate distance in all directions utilizing iris scissors.
Pre-Excision Curettage Text (Leave Blank If You Do Not Want): Prior to drawing the surgical margin the visible lesion was removed with electrodesiccation and curettage to clearly define the lesion size.
Muscle Hinge Flap Text: The defect edges were debeveled with a #15 scalpel blade.  Given the size, depth and location of the defect and the proximity to free margins a muscle hinge flap was deemed most appropriate.  Using a sterile surgical marker, an appropriate hinge flap was drawn incorporating the defect. The area thus outlined was incised with a #15 scalpel blade.  The skin margins were undermined to an appropriate distance in all directions utilizing iris scissors.
M-Plasty Intermediate Repair Preamble Text (Leave Blank If You Do Not Want): Undermining was performed with blunt dissection.
Excisional Biopsy Additional Text (Leave Blank If You Do Not Want): The margin was drawn around the clinically apparent lesion. An elliptical shape was then drawn on the skin incorporating the lesion and margins.  Incisions were then made along these lines to the appropriate tissue plane and the lesion was extirpated.
Burow's Advancement Flap Text: The defect edges were debeveled with a #15 scalpel blade.  Given the location of the defect and the proximity to free margins a Burow's advancement flap was deemed most appropriate.  Using a sterile surgical marker, the appropriate advancement flap was drawn incorporating the defect and placing the expected incisions within the relaxed skin tension lines where possible.    The area thus outlined was incised deep to adipose tissue with a #15 scalpel blade.  The skin margins were undermined to an appropriate distance in all directions utilizing iris scissors.
Complex Repair And Ftsg Text: The defect edges were debeveled with a #15 scalpel blade.  The primary defect was closed partially with a complex linear closure.  Given the location of the defect, shape of the defect and the proximity to free margins a full thickness skin graft was deemed most appropriate to repair the remaining defect.  The graft was trimmed to fit the size of the remaining defect.  The graft was then placed in the primary defect, oriented appropriately, and sutured into place.
O-Z Flap Text: The defect edges were debeveled with a #15 scalpel blade.  Given the location of the defect, shape of the defect and the proximity to free margins an O-Z flap was deemed most appropriate.  Using a sterile surgical marker, an appropriate transposition flap was drawn incorporating the defect and placing the expected incisions within the relaxed skin tension lines where possible. The area thus outlined was incised deep to adipose tissue with a #15 scalpel blade.  The skin margins were undermined to an appropriate distance in all directions utilizing iris scissors.
Bi-Rhombic Flap Text: The defect edges were debeveled with a #15 scalpel blade.  Given the location of the defect and the proximity to free margins a bi-rhombic flap was deemed most appropriate.  Using a sterile surgical marker, an appropriate rhombic flap was drawn incorporating the defect. The area thus outlined was incised deep to adipose tissue with a #15 scalpel blade.  The skin margins were undermined to an appropriate distance in all directions utilizing iris scissors.
Helical Rim Advancement Flap Text: The defect edges were debeveled with a #15 blade scalpel.  Given the location of the defect and the proximity to free margins (helical rim) a double helical rim advancement flap was deemed most appropriate.  Using a sterile surgical marker, the appropriate advancement flaps were drawn incorporating the defect and placing the expected incisions between the helical rim and antihelix where possible.  The area thus outlined was incised through and through with a #15 scalpel blade.  With a skin hook and iris scissors, the flaps were gently and sharply undermined and freed up.
Mercedes Flap Text: The defect edges were debeveled with a #15 scalpel blade.  Given the location of the defect, shape of the defect and the proximity to free margins a Mercedes flap was deemed most appropriate.  Using a sterile surgical marker, an appropriate advancement flap was drawn incorporating the defect and placing the expected incisions within the relaxed skin tension lines where possible. The area thus outlined was incised deep to adipose tissue with a #15 scalpel blade.  The skin margins were undermined to an appropriate distance in all directions utilizing iris scissors.
O-T Plasty Text: The defect edges were debeveled with a #15 scalpel blade.  Given the location of the defect, shape of the defect and the proximity to free margins an O-T plasty was deemed most appropriate.  Using a sterile surgical marker, an appropriate O-T plasty was drawn incorporating the defect and placing the expected incisions within the relaxed skin tension lines where possible.    The area thus outlined was incised deep to adipose tissue with a #15 scalpel blade.  The skin margins were undermined to an appropriate distance in all directions utilizing iris scissors.
Repair Performed By Another Provider Text (Leave Blank If You Do Not Want): After the tissue was excised the defect was repaired by another provider.
Complex Repair And Transposition Flap Text: The defect edges were debeveled with a #15 scalpel blade.  The primary defect was closed partially with a complex linear closure.  Given the location of the remaining defect, shape of the defect and the proximity to free margins a transposition flap was deemed most appropriate for complete closure of the defect.  Using a sterile surgical marker, an appropriate advancement flap was drawn incorporating the defect and placing the expected incisions within the relaxed skin tension lines where possible.    The area thus outlined was incised deep to adipose tissue with a #15 scalpel blade.  The skin margins were undermined to an appropriate distance in all directions utilizing iris scissors.
Complex Repair And Rhombic Flap Text: The defect edges were debeveled with a #15 scalpel blade.  The primary defect was closed partially with a complex linear closure.  Given the location of the remaining defect, shape of the defect and the proximity to free margins a rhombic flap was deemed most appropriate for complete closure of the defect.  Using a sterile surgical marker, an appropriate advancement flap was drawn incorporating the defect and placing the expected incisions within the relaxed skin tension lines where possible.    The area thus outlined was incised deep to adipose tissue with a #15 scalpel blade.  The skin margins were undermined to an appropriate distance in all directions utilizing iris scissors.
Chonodrocutaneous Helical Advancement Flap Text: The defect edges were debeveled with a #15 scalpel blade.  Given the location of the defect and the proximity to free margins a chondrocutaneous helical advancement flap was deemed most appropriate.  Using a sterile surgical marker, the appropriate advancement flap was drawn incorporating the defect and placing the expected incisions within the relaxed skin tension lines where possible.    The area thus outlined was incised deep to adipose tissue with a #15 scalpel blade.  The skin margins were undermined to an appropriate distance in all directions utilizing iris scissors.
M-Plasty Complex Repair Preamble Text (Leave Blank If You Do Not Want): Extensive wide undermining was performed.
Ftsg Text: The defect edges were debeveled with a #15 scalpel blade.  Given the location of the defect, shape of the defect and the proximity to free margins a full thickness skin graft was deemed most appropriate.  Using a sterile surgical marker, the primary defect shape was transferred to the donor site. The area thus outlined was incised deep to adipose tissue with a #15 scalpel blade.  The harvested graft was then trimmed of adipose tissue until only dermis and epidermis was left.  The skin margins of the secondary defect were undermined to an appropriate distance in all directions utilizing iris scissors.  The secondary defect was closed with interrupted buried subcutaneous sutures.  The skin edges were then re-apposed with running  sutures.  The skin graft was then placed in the primary defect and oriented appropriately.
Intermediate / Complex Repair - Final Wound Length In Cm: 3.5
Epidermal Closure Graft Donor Site (Optional): simple interrupted
Complex Repair And Split-Thickness Skin Graft Text: The defect edges were debeveled with a #15 scalpel blade.  The primary defect was closed partially with a complex linear closure.  Given the location of the defect, shape of the defect and the proximity to free margins a split thickness skin graft was deemed most appropriate to repair the remaining defect.  The graft was trimmed to fit the size of the remaining defect.  The graft was then placed in the primary defect, oriented appropriately, and sutured into place.
Wound Care: Petrolatum
Dermal Autograft Text: The defect edges were debeveled with a #15 scalpel blade.  Given the location of the defect, shape of the defect and the proximity to free margins a dermal autograft was deemed most appropriate.  Using a sterile surgical marker, the primary defect shape was transferred to the donor site. The area thus outlined was incised deep to adipose tissue with a #15 scalpel blade.  The harvested graft was then trimmed of adipose and epidermal tissue until only dermis was left.  The skin graft was then placed in the primary defect and oriented appropriately.
Size Of Margin In Cm: 0.2
Star Wedge Flap Text: The defect edges were debeveled with a #15 scalpel blade.  Given the location of the defect, shape of the defect and the proximity to free margins a star wedge flap was deemed most appropriate.  Using a sterile surgical marker, an appropriate rotation flap was drawn incorporating the defect and placing the expected incisions within the relaxed skin tension lines where possible. The area thus outlined was incised deep to adipose tissue with a #15 scalpel blade.  The skin margins were undermined to an appropriate distance in all directions utilizing iris scissors.
Complex Repair And Double Advancement Flap Text: The defect edges were debeveled with a #15 scalpel blade.  The primary defect was closed partially with a complex linear closure.  Given the location of the remaining defect, shape of the defect and the proximity to free margins a double advancement flap was deemed most appropriate for complete closure of the defect.  Using a sterile surgical marker, an appropriate advancement flap was drawn incorporating the defect and placing the expected incisions within the relaxed skin tension lines where possible.    The area thus outlined was incised deep to adipose tissue with a #15 scalpel blade.  The skin margins were undermined to an appropriate distance in all directions utilizing iris scissors.
Anesthesia Type: 1% lidocaine with epinephrine
Complex Repair And Epidermal Autograft Text: The defect edges were debeveled with a #15 scalpel blade.  The primary defect was closed partially with a complex linear closure.  Given the location of the defect, shape of the defect and the proximity to free margins an epidermal autograft was deemed most appropriate to repair the remaining defect.  The graft was trimmed to fit the size of the remaining defect.  The graft was then placed in the primary defect, oriented appropriately, and sutured into place.
Hatchet Flap Text: The defect edges were debeveled with a #15 scalpel blade.  Given the location of the defect, shape of the defect and the proximity to free margins a hatchet flap was deemed most appropriate.  Using a sterile surgical marker, an appropriate hatchet flap was drawn incorporating the defect and placing the expected incisions within the relaxed skin tension lines where possible.    The area thus outlined was incised deep to adipose tissue with a #15 scalpel blade.  The skin margins were undermined to an appropriate distance in all directions utilizing iris scissors.
Alar Island Pedicle Flap Text: The defect edges were debeveled with a #15 scalpel blade.  Given the location of the defect, shape of the defect and the proximity to the alar rim an island pedicle advancement flap was deemed most appropriate.  Using a sterile surgical marker, an appropriate advancement flap was drawn incorporating the defect, outlining the appropriate donor tissue and placing the expected incisions within the nasal ala running parallel to the alar rim. The area thus outlined was incised with a #15 scalpel blade.  The skin margins were undermined minimally to an appropriate distance in all directions around the primary defect and laterally outward around the island pedicle utilizing iris scissors.  There was minimal undermining beneath the pedicle flap.
Slit Excision Additional Text (Leave Blank If You Do Not Want): A linear line was drawn on the skin overlying the lesion. An incision was made slowly until the lesion was visualized.  Once visualized, the lesion was removed with blunt dissection.
Partial Purse String (Intermediate) Text: Given the location of the defect and the characteristics of the surrounding skin an intermediate purse string closure was deemed most appropriate.  Undermining was performed circumferentially around the surgical defect.  A purse string suture was then placed and tightened. Wound tension of the circular defect prevented complete closure of the wound.
Rhomboid Transposition Flap Text: The defect edges were debeveled with a #15 scalpel blade.  Given the location of the defect and the proximity to free margins a rhomboid transposition flap was deemed most appropriate.  Using a sterile surgical marker, an appropriate rhomboid flap was drawn incorporating the defect.    The area thus outlined was incised deep to adipose tissue with a #15 scalpel blade.  The skin margins were undermined to an appropriate distance in all directions utilizing iris scissors.
Partial Purse String (Simple) Text: Given the location of the defect and the characteristics of the surrounding skin a simple purse string closure was deemed most appropriate.  Undermining was performed circumferentially around the surgical defect.  A purse string suture was then placed and tightened. Wound tension of the circular defect prevented complete closure of the wound.
Perilesional Excision Additional Text (Leave Blank If You Do Not Want): The margin was drawn around the clinically apparent lesion. Incisions were then made along these lines to the appropriate tissue plane and the lesion was extirpated.
Xenograft Text: The defect edges were debeveled with a #15 scalpel blade.  Given the location of the defect, shape of the defect and the proximity to free margins a xenograft was deemed most appropriate.  The graft was then trimmed to fit the size of the defect.  The graft was then placed in the primary defect and oriented appropriately.
S Plasty Text: Given the location and shape of the defect, and the orientation of relaxed skin tension lines, an S-plasty was deemed most appropriate for repair.  Using a sterile surgical marker, the appropriate outline of the S-plasty was drawn, incorporating the defect and placing the expected incisions within the relaxed skin tension lines where possible.  The area thus outlined was incised deep to adipose tissue with a #15 scalpel blade.  The skin margins were undermined to an appropriate distance in all directions utilizing iris scissors. The skin flaps were advanced over the defect.  The opposing margins were then approximated with interrupted buried subcutaneous sutures.
Number Of Deep Sutures (Optional): 4
Rotation Flap Text: The defect edges were debeveled with a #15 scalpel blade.  Given the location of the defect, shape of the defect and the proximity to free margins a rotation flap was deemed most appropriate.  Using a sterile surgical marker, an appropriate rotation flap was drawn incorporating the defect and placing the expected incisions within the relaxed skin tension lines where possible.    The area thus outlined was incised deep to adipose tissue with a #15 scalpel blade.  The skin margins were undermined to an appropriate distance in all directions utilizing iris scissors.
O-L Flap Text: The defect edges were debeveled with a #15 scalpel blade.  Given the location of the defect, shape of the defect and the proximity to free margins an O-L flap was deemed most appropriate.  Using a sterile surgical marker, an appropriate advancement flap was drawn incorporating the defect and placing the expected incisions within the relaxed skin tension lines where possible.    The area thus outlined was incised deep to adipose tissue with a #15 scalpel blade.  The skin margins were undermined to an appropriate distance in all directions utilizing iris scissors.
A-T Advancement Flap Text: The defect edges were debeveled with a #15 scalpel blade.  Given the location of the defect, shape of the defect and the proximity to free margins an A-T advancement flap was deemed most appropriate.  Using a sterile surgical marker, an appropriate advancement flap was drawn incorporating the defect and placing the expected incisions within the relaxed skin tension lines where possible.    The area thus outlined was incised deep to adipose tissue with a #15 scalpel blade.  The skin margins were undermined to an appropriate distance in all directions utilizing iris scissors.
Epidermal Autograft Text: The defect edges were debeveled with a #15 scalpel blade.  Given the location of the defect, shape of the defect and the proximity to free margins an epidermal autograft was deemed most appropriate.  Using a sterile surgical marker, the primary defect shape was transferred to the donor site. The epidermal graft was then harvested.  The skin graft was then placed in the primary defect and oriented appropriately.
Posterior Auricular Interpolation Flap Text: A decision was made to reconstruct the defect utilizing an interpolation axial flap and a staged reconstruction.  A telfa template was made of the defect.  This telfa template was then used to outline the posterior auricular interpolation flap.  The donor area for the pedicle flap was then injected with anesthesia.  The flap was excised through the skin and subcutaneous tissue down to the layer of the underlying musculature.  The pedicle flap was carefully excised within this deep plane to maintain its blood supply.  The edges of the donor site were undermined.   The donor site was closed in a primary fashion.  The pedicle was then rotated into position and sutured.  Once the tube was sutured into place, adequate blood supply was confirmed with blanching and refill.  The pedicle was then wrapped with xeroform gauze and dressed appropriately with a telfa and gauze bandage to ensure continued blood supply and protect the attached pedicle.
Lip Wedge Excision Repair Text: Given the location of the defect and the proximity to free margins a full thickness wedge repair was deemed most appropriate.  Using a sterile surgical marker, the appropriate repair was drawn incorporating the defect and placing the expected incisions perpendicular to the vermilion border.  The vermilion border was also meticulously outlined to ensure appropriate reapproximation during the repair.  The area thus outlined was incised through and through with a #15 scalpel blade.  The muscularis and dermis were reaproximated with deep sutures following hemostasis. Care was taken to realign the vermilion border before proceeding with the superficial closure.  Once the vermilion was realigned the superfical and mucosal closure was finished.
Double O-Z Plasty Text: The defect edges were debeveled with a #15 scalpel blade.  Given the location of the defect, shape of the defect and the proximity to free margins a Double O-Z plasty (double transposition flap) was deemed most appropriate.  Using a sterile surgical marker, the appropriate transposition flaps were drawn incorporating the defect and placing the expected incisions within the relaxed skin tension lines where possible. The area thus outlined was incised deep to adipose tissue with a #15 scalpel blade.  The skin margins were undermined to an appropriate distance in all directions utilizing iris scissors.  Hemostasis was achieved with electrocautery.  The flaps were then transposed into place, one clockwise and the other counterclockwise, and anchored with interrupted buried subcutaneous sutures.
Dorsal Nasal Flap Text: The defect edges were debeveled with a #15 scalpel blade.  Given the location of the defect and the proximity to free margins a dorsal nasal flap was deemed most appropriate.  Using a sterile surgical marker, an appropriate dorsal nasal flap was drawn around the defect.    The area thus outlined was incised deep to adipose tissue with a #15 scalpel blade.  The skin margins were undermined to an appropriate distance in all directions utilizing iris scissors.
Scalpel Size: 15 blade
Bilateral Helical Rim Advancement Flap Text: The defect edges were debeveled with a #15 blade scalpel.  Given the location of the defect and the proximity to free margins (helical rim) a bilateral helical rim advancement flap was deemed most appropriate.  Using a sterile surgical marker, the appropriate advancement flaps were drawn incorporating the defect and placing the expected incisions between the helical rim and antihelix where possible.  The area thus outlined was incised through and through with a #15 scalpel blade.  With a skin hook and iris scissors, the flaps were gently and sharply undermined and freed up.
Dressing: dry sterile dressing
Billing Type: Third-Party Bill
Elliptical Excision Additional Text (Leave Blank If You Do Not Want): The margin was drawn around the clinically apparent lesion.  An elliptical shape was then drawn on the skin incorporating the lesion and margins.  Incisions were then made along these lines to the appropriate tissue plane and the lesion was extirpated.
Mastoid Interpolation Flap Text: A decision was made to reconstruct the defect utilizing an interpolation axial flap and a staged reconstruction.  A telfa template was made of the defect.  This telfa template was then used to outline the mastoid interpolation flap.  The donor area for the pedicle flap was then injected with anesthesia.  The flap was excised through the skin and subcutaneous tissue down to the layer of the underlying musculature.  The pedicle flap was carefully excised within this deep plane to maintain its blood supply.  The edges of the donor site were undermined.   The donor site was closed in a primary fashion.  The pedicle was then rotated into position and sutured.  Once the tube was sutured into place, adequate blood supply was confirmed with blanching and refill.  The pedicle was then wrapped with xeroform gauze and dressed appropriately with a telfa and gauze bandage to ensure continued blood supply and protect the attached pedicle.
Home Suture Removal Text: Patient was provided a home suture removal kit and will remove their sutures at home.  If they have any questions or difficulties they will call the office.
Double O-Z Flap Text: The defect edges were debeveled with a #15 scalpel blade.  Given the location of the defect, shape of the defect and the proximity to free margins a Double O-Z flap was deemed most appropriate.  Using a sterile surgical marker, an appropriate transposition flap was drawn incorporating the defect and placing the expected incisions within the relaxed skin tension lines where possible. The area thus outlined was incised deep to adipose tissue with a #15 scalpel blade.  The skin margins were undermined to an appropriate distance in all directions utilizing iris scissors.
Complex Repair And Single Advancement Flap Text: The defect edges were debeveled with a #15 scalpel blade.  The primary defect was closed partially with a complex linear closure.  Given the location of the remaining defect, shape of the defect and the proximity to free margins a single advancement flap was deemed most appropriate for complete closure of the defect.  Using a sterile surgical marker, an appropriate advancement flap was drawn incorporating the defect and placing the expected incisions within the relaxed skin tension lines where possible.    The area thus outlined was incised deep to adipose tissue with a #15 scalpel blade.  The skin margins were undermined to an appropriate distance in all directions utilizing iris scissors.
W Plasty Text: The lesion was extirpated to the level of the fat with a #15 scalpel blade.  Given the location of the defect, shape of the defect and the proximity to free margins a W-plasty was deemed most appropriate for repair.  Using a sterile surgical marker, the appropriate transposition arms of the W-plasty were drawn incorporating the defect and placing the expected incisions within the relaxed skin tension lines where possible.    The area thus outlined was incised deep to adipose tissue with a #15 scalpel blade.  The skin margins were undermined to an appropriate distance in all directions utilizing iris scissors.  The opposing transposition arms were then transposed into place in opposite direction and anchored with interrupted buried subcutaneous sutures.
Bilobed Flap Text: The defect edges were debeveled with a #15 scalpel blade.  Given the location of the defect and the proximity to free margins a bilobe flap was deemed most appropriate.  Using a sterile surgical marker, an appropriate bilobe flap drawn around the defect.    The area thus outlined was incised deep to adipose tissue with a #15 scalpel blade.  The skin margins were undermined to an appropriate distance in all directions utilizing iris scissors.
Z Plasty Text: The lesion was extirpated to the level of the fat with a #15 scalpel blade.  Given the location of the defect, shape of the defect and the proximity to free margins a Z-plasty was deemed most appropriate for repair.  Using a sterile surgical marker, the appropriate transposition arms of the Z-plasty were drawn incorporating the defect and placing the expected incisions within the relaxed skin tension lines where possible.    The area thus outlined was incised deep to adipose tissue with a #15 scalpel blade.  The skin margins were undermined to an appropriate distance in all directions utilizing iris scissors.  The opposing transposition arms were then transposed into place in opposite direction and anchored with interrupted buried subcutaneous sutures.
Repair Type: Intermediate
Information: Selecting Yes will display possible errors in your note based on the variables you have selected. This validation is only offered as a suggestion for you. PLEASE NOTE THAT THE VALIDATION TEXT WILL BE REMOVED WHEN YOU FINALIZE YOUR NOTE. IF YOU WANT TO FAX A PRELIMINARY NOTE YOU WILL NEED TO TOGGLE THIS TO 'NO' IF YOU DO NOT WANT IT IN YOUR FAXED NOTE.
Mucosal Advancement Flap Text: Given the location of the defect, shape of the defect and the proximity to free margins a mucosal advancement flap was deemed most appropriate. Incisions were made with a 15 blade scalpel in the appropriate fashion along the cutaneous vermilion border and the mucosal lip. The remaining actinically damaged mucosal tissue was excised.  The mucosal advancement flap was then elevated to the gingival sulcus with care taken to preserve the neurovascular structures and advanced into the primary defect. Care was taken to ensure that precise realignment of the vermilion border was achieved.
Excision Method: Elliptical
Complex Repair And Dermal Autograft Text: The defect edges were debeveled with a #15 scalpel blade.  The primary defect was closed partially with a complex linear closure.  Given the location of the defect, shape of the defect and the proximity to free margins an dermal autograft was deemed most appropriate to repair the remaining defect.  The graft was trimmed to fit the size of the remaining defect.  The graft was then placed in the primary defect, oriented appropriately, and sutured into place.
V-Y Flap Text: The defect edges were debeveled with a #15 scalpel blade.  Given the location of the defect, shape of the defect and the proximity to free margins a V-Y flap was deemed most appropriate.  Using a sterile surgical marker, an appropriate advancement flap was drawn incorporating the defect and placing the expected incisions within the relaxed skin tension lines where possible.    The area thus outlined was incised deep to adipose tissue with a #15 scalpel blade.  The skin margins were undermined to an appropriate distance in all directions utilizing iris scissors.
Crescentic Advancement Flap Text: The defect edges were debeveled with a #15 scalpel blade.  Given the location of the defect and the proximity to free margins a crescentic advancement flap was deemed most appropriate.  Using a sterile surgical marker, the appropriate advancement flap was drawn incorporating the defect and placing the expected incisions within the relaxed skin tension lines where possible.    The area thus outlined was incised deep to adipose tissue with a #15 scalpel blade.  The skin margins were undermined to an appropriate distance in all directions utilizing iris scissors.
Anesthesia Volume In Cc: 6
Complex Repair And Bilobe Flap Text: The defect edges were debeveled with a #15 scalpel blade.  The primary defect was closed partially with a complex linear closure.  Given the location of the remaining defect, shape of the defect and the proximity to free margins a bilobe flap was deemed most appropriate for complete closure of the defect.  Using a sterile surgical marker, an appropriate advancement flap was drawn incorporating the defect and placing the expected incisions within the relaxed skin tension lines where possible.    The area thus outlined was incised deep to adipose tissue with a #15 scalpel blade.  The skin margins were undermined to an appropriate distance in all directions utilizing iris scissors.
Lab Facility: 
Cartilage Graft Text: The defect edges were debeveled with a #15 scalpel blade.  Given the location of the defect, shape of the defect, the fact the defect involved a full thickness cartilage defect a cartilage graft was deemed most appropriate.  An appropriate donor site was identified, cleansed, and anesthetized. The cartilage graft was then harvested and transferred to the recipient site, oriented appropriately and then sutured into place.  The secondary defect was then repaired using a primary closure.
Cheek-To-Nose Interpolation Flap Text: A decision was made to reconstruct the defect utilizing an interpolation axial flap and a staged reconstruction.  A telfa template was made of the defect.  This telfa template was then used to outline the Cheek-To-Nose Interpolation flap.  The donor area for the pedicle flap was then injected with anesthesia.  The flap was excised through the skin and subcutaneous tissue down to the layer of the underlying musculature.  The interpolation flap was carefully excised within this deep plane to maintain its blood supply.  The edges of the donor site were undermined.   The donor site was closed in a primary fashion.  The pedicle was then rotated into position and sutured.  Once the tube was sutured into place, adequate blood supply was confirmed with blanching and refill.  The pedicle was then wrapped with xeroform gauze and dressed appropriately with a telfa and gauze bandage to ensure continued blood supply and protect the attached pedicle.
Curvilinear Excision Additional Text (Leave Blank If You Do Not Want): The margin was drawn around the clinically apparent lesion.  A curvilinear shape was then drawn on the skin incorporating the lesion and margins.  Incisions were then made along these lines to the appropriate tissue plane and the lesion was extirpated.
Complex Repair And M Plasty Text: The defect edges were debeveled with a #15 scalpel blade.  The primary defect was closed partially with a complex linear closure.  Given the location of the remaining defect, shape of the defect and the proximity to free margins an M plasty was deemed most appropriate for complete closure of the defect.  Using a sterile surgical marker, an appropriate advancement flap was drawn incorporating the defect and placing the expected incisions within the relaxed skin tension lines where possible.    The area thus outlined was incised deep to adipose tissue with a #15 scalpel blade.  The skin margins were undermined to an appropriate distance in all directions utilizing iris scissors.
Graft Donor Site Bandage (Optional-Leave Blank If You Don't Want In Note): Steri-strips and a pressure bandage were applied to the donor site.
Size Of Lesion In Cm: 0.8
Melolabial Interpolation Flap Text: A decision was made to reconstruct the defect utilizing an interpolation axial flap and a staged reconstruction.  A telfa template was made of the defect.  This telfa template was then used to outline the melolabial interpolation flap.  The donor area for the pedicle flap was then injected with anesthesia.  The flap was excised through the skin and subcutaneous tissue down to the layer of the underlying musculature.  The pedicle flap was carefully excised within this deep plane to maintain its blood supply.  The edges of the donor site were undermined.   The donor site was closed in a primary fashion.  The pedicle was then rotated into position and sutured.  Once the tube was sutured into place, adequate blood supply was confirmed with blanching and refill.  The pedicle was then wrapped with xeroform gauze and dressed appropriately with a telfa and gauze bandage to ensure continued blood supply and protect the attached pedicle.
O-Z Plasty Text: The defect edges were debeveled with a #15 scalpel blade.  Given the location of the defect, shape of the defect and the proximity to free margins an O-Z plasty (double transposition flap) was deemed most appropriate.  Using a sterile surgical marker, the appropriate transposition flaps were drawn incorporating the defect and placing the expected incisions within the relaxed skin tension lines where possible.    The area thus outlined was incised deep to adipose tissue with a #15 scalpel blade.  The skin margins were undermined to an appropriate distance in all directions utilizing iris scissors.  Hemostasis was achieved with electrocautery.  The flaps were then transposed into place, one clockwise and the other counterclockwise, and anchored with interrupted buried subcutaneous sutures.
Composite Graft Text: The defect edges were debeveled with a #15 scalpel blade.  Given the location of the defect, shape of the defect, the proximity to free margins and the fact the defect was full thickness a composite graft was deemed most appropriate.  The defect was outline and then transferred to the donor site.  A full thickness graft was then excised from the donor site. The graft was then placed in the primary defect, oriented appropriately and then sutured into place.  The secondary defect was then repaired using a primary closure.
Epidermal Sutures: 4-0 Prolene
Island Pedicle Flap Text: The defect edges were debeveled with a #15 scalpel blade.  Given the location of the defect, shape of the defect and the proximity to free margins an island pedicle advancement flap was deemed most appropriate.  Using a sterile surgical marker, an appropriate advancement flap was drawn incorporating the defect, outlining the appropriate donor tissue and placing the expected incisions within the relaxed skin tension lines where possible.    The area thus outlined was incised deep to adipose tissue with a #15 scalpel blade.  The skin margins were undermined to an appropriate distance in all directions around the primary defect and laterally outward around the island pedicle utilizing iris scissors.  There was minimal undermining beneath the pedicle flap.
Complex Repair And Melolabial Flap Text: The defect edges were debeveled with a #15 scalpel blade.  The primary defect was closed partially with a complex linear closure.  Given the location of the remaining defect, shape of the defect and the proximity to free margins a melolabial flap was deemed most appropriate for complete closure of the defect.  Using a sterile surgical marker, an appropriate advancement flap was drawn incorporating the defect and placing the expected incisions within the relaxed skin tension lines where possible.    The area thus outlined was incised deep to adipose tissue with a #15 scalpel blade.  The skin margins were undermined to an appropriate distance in all directions utilizing iris scissors.
Complex Repair And Modified Advancement Flap Text: The defect edges were debeveled with a #15 scalpel blade.  The primary defect was closed partially with a complex linear closure.  Given the location of the remaining defect, shape of the defect and the proximity to free margins a modified advancement flap was deemed most appropriate for complete closure of the defect.  Using a sterile surgical marker, an appropriate advancement flap was drawn incorporating the defect and placing the expected incisions within the relaxed skin tension lines where possible.    The area thus outlined was incised deep to adipose tissue with a #15 scalpel blade.  The skin margins were undermined to an appropriate distance in all directions utilizing iris scissors.
Dermal Closure: buried vertical mattress
Split-Thickness Skin Graft Text: The defect edges were debeveled with a #15 scalpel blade.  Given the location of the defect, shape of the defect and the proximity to free margins a split thickness skin graft was deemed most appropriate.  Using a sterile surgical marker, the primary defect shape was transferred to the donor site. The split thickness graft was then harvested.  The skin graft was then placed in the primary defect and oriented appropriately.
Interpolation Flap Text: A decision was made to reconstruct the defect utilizing an interpolation axial flap and a staged reconstruction.  A telfa template was made of the defect.  This telfa template was then used to outline the interpolation flap.  The donor area for the pedicle flap was then injected with anesthesia.  The flap was excised through the skin and subcutaneous tissue down to the layer of the underlying musculature.  The interpolation flap was carefully excised within this deep plane to maintain its blood supply.  The edges of the donor site were undermined.   The donor site was closed in a primary fashion.  The pedicle was then rotated into position and sutured.  Once the tube was sutured into place, adequate blood supply was confirmed with blanching and refill.  The pedicle was then wrapped with xeroform gauze and dressed appropriately with a telfa and gauze bandage to ensure continued blood supply and protect the attached pedicle.
Complex Repair And Z Plasty Text: The defect edges were debeveled with a #15 scalpel blade.  The primary defect was closed partially with a complex linear closure.  Given the location of the remaining defect, shape of the defect and the proximity to free margins a Z plasty was deemed most appropriate for complete closure of the defect.  Using a sterile surgical marker, an appropriate advancement flap was drawn incorporating the defect and placing the expected incisions within the relaxed skin tension lines where possible.    The area thus outlined was incised deep to adipose tissue with a #15 scalpel blade.  The skin margins were undermined to an appropriate distance in all directions utilizing iris scissors.
Melolabial Transposition Flap Text: The defect edges were debeveled with a #15 scalpel blade.  Given the location of the defect and the proximity to free margins a melolabial flap was deemed most appropriate.  Using a sterile surgical marker, an appropriate melolabial transposition flap was drawn incorporating the defect.    The area thus outlined was incised deep to adipose tissue with a #15 scalpel blade.  The skin margins were undermined to an appropriate distance in all directions utilizing iris scissors.
Complex Repair And Tissue Cultured Epidermal Autograft Text: The defect edges were debeveled with a #15 scalpel blade.  The primary defect was closed partially with a complex linear closure.  Given the location of the defect, shape of the defect and the proximity to free margins an tissue cultured epidermal autograft was deemed most appropriate to repair the remaining defect.  The graft was trimmed to fit the size of the remaining defect.  The graft was then placed in the primary defect, oriented appropriately, and sutured into place.
Hemostasis: Electrodesiccation
Complex Repair And V-Y Plasty Text: The defect edges were debeveled with a #15 scalpel blade.  The primary defect was closed partially with a complex linear closure.  Given the location of the remaining defect, shape of the defect and the proximity to free margins a V-Y plasty was deemed most appropriate for complete closure of the defect.  Using a sterile surgical marker, an appropriate advancement flap was drawn incorporating the defect and placing the expected incisions within the relaxed skin tension lines where possible.    The area thus outlined was incised deep to adipose tissue with a #15 scalpel blade.  The skin margins were undermined to an appropriate distance in all directions utilizing iris scissors.
Lab: [id number]
Complex Repair And Xenograft Text: The defect edges were debeveled with a #15 scalpel blade.  The primary defect was closed partially with a complex linear closure.  Given the location of the defect, shape of the defect and the proximity to free margins a xenograft was deemed most appropriate to repair the remaining defect.  The graft was trimmed to fit the size of the remaining defect.  The graft was then placed in the primary defect, oriented appropriately, and sutured into place.
Deep Sutures: 3-0 Vicryl
Post-Care Instructions: I reviewed with the patient in detail post-care instructions. Patient is not to engage in any heavy lifting, exercise which causes pulling on the excision site, or swimming for the next 14 days. The original dressing should be left in place for 2-3 days unless it becomes wet.  The dressing should then be changed daily with fresh petrolatum applied with a clean Q-tip and new bandage placed until the sutures are removed.  Should the patient develop any fevers, chills, bleeding, severe pain patient will contact the office immediately.
Excision Depth: deep subcutaneous fat
Advancement-Rotation Flap Text: The defect edges were debeveled with a #15 scalpel blade.  Given the location of the defect, shape of the defect and the proximity to free margins an advancement-rotation flap was deemed most appropriate.  Using a sterile surgical marker, an appropriate flap was drawn incorporating the defect and placing the expected incisions within the relaxed skin tension lines where possible. The area thus outlined was incised deep to adipose tissue with a #15 scalpel blade.  The skin margins were undermined to an appropriate distance in all directions utilizing iris scissors.
Spiral Flap Text: The defect edges were debeveled with a #15 scalpel blade.  Given the location of the defect, shape of the defect and the proximity to free margins a spiral flap was deemed most appropriate.  Using a sterile surgical marker, an appropriate rotation flap was drawn incorporating the defect and placing the expected incisions within the relaxed skin tension lines where possible. The area thus outlined was incised deep to adipose tissue with a #15 scalpel blade.  The skin margins were undermined to an appropriate distance in all directions utilizing iris scissors.
Complex Repair And Rotation Flap Text: The defect edges were debeveled with a #15 scalpel blade.  The primary defect was closed partially with a complex linear closure.  Given the location of the remaining defect, shape of the defect and the proximity to free margins a rotation flap was deemed most appropriate for complete closure of the defect.  Using a sterile surgical marker, an appropriate advancement flap was drawn incorporating the defect and placing the expected incisions within the relaxed skin tension lines where possible.    The area thus outlined was incised deep to adipose tissue with a #15 scalpel blade.  The skin margins were undermined to an appropriate distance in all directions utilizing iris scissors.
Consent was obtained from the patient. The risks and benefits to therapy were discussed in detail. Specifically, the risks of infection, scarring, bleeding, prolonged wound healing, incomplete removal, allergy to anesthesia, nerve injury and recurrence were addressed. Prior to the procedure, the treatment site was clearly identified and confirmed by the patient. All components of Universal Protocol/PAUSE Rule completed.
Epidermal Closure: subcuticular
Suture Removal: 14 days
O-T Advancement Flap Text: The defect edges were debeveled with a #15 scalpel blade.  Given the location of the defect, shape of the defect and the proximity to free margins an O-T advancement flap was deemed most appropriate.  Using a sterile surgical marker, an appropriate advancement flap was drawn incorporating the defect and placing the expected incisions within the relaxed skin tension lines where possible.    The area thus outlined was incised deep to adipose tissue with a #15 scalpel blade.  The skin margins were undermined to an appropriate distance in all directions utilizing iris scissors.
Complex Repair And Dorsal Nasal Flap Text: The defect edges were debeveled with a #15 scalpel blade.  The primary defect was closed partially with a complex linear closure.  Given the location of the remaining defect, shape of the defect and the proximity to free margins a dorsal nasal flap was deemed most appropriate for complete closure of the defect.  Using a sterile surgical marker, an appropriate flap was drawn incorporating the defect and placing the expected incisions within the relaxed skin tension lines where possible.    The area thus outlined was incised deep to adipose tissue with a #15 scalpel blade.  The skin margins were undermined to an appropriate distance in all directions utilizing iris scissors.
Positioning (Leave Blank If You Do Not Want): The patient was placed in a comfortable position exposing the surgical site.
Trilobed Flap Text: The defect edges were debeveled with a #15 scalpel blade.  Given the location of the defect and the proximity to free margins a trilobed flap was deemed most appropriate.  Using a sterile surgical marker, an appropriate trilobed flap drawn around the defect.    The area thus outlined was incised deep to adipose tissue with a #15 scalpel blade.  The skin margins were undermined to an appropriate distance in all directions utilizing iris scissors.
Rhombic Flap Text: The defect edges were debeveled with a #15 scalpel blade.  Given the location of the defect and the proximity to free margins a rhombic flap was deemed most appropriate.  Using a sterile surgical marker, an appropriate rhombic flap was drawn incorporating the defect.    The area thus outlined was incised deep to adipose tissue with a #15 scalpel blade.  The skin margins were undermined to an appropriate distance in all directions utilizing iris scissors.
Double Island Pedicle Flap Text: The defect edges were debeveled with a #15 scalpel blade.  Given the location of the defect, shape of the defect and the proximity to free margins a double island pedicle advancement flap was deemed most appropriate.  Using a sterile surgical marker, an appropriate advancement flap was drawn incorporating the defect, outlining the appropriate donor tissue and placing the expected incisions within the relaxed skin tension lines where possible.    The area thus outlined was incised deep to adipose tissue with a #15 scalpel blade.  The skin margins were undermined to an appropriate distance in all directions around the primary defect and laterally outward around the island pedicle utilizing iris scissors.  There was minimal undermining beneath the pedicle flap.
Island Pedicle Flap-Requiring Vessel Identification Text: The defect edges were debeveled with a #15 scalpel blade.  Given the location of the defect, shape of the defect and the proximity to free margins an island pedicle advancement flap was deemed most appropriate.  Using a sterile surgical marker, an appropriate advancement flap was drawn, based on the axial vessel mentioned above, incorporating the defect, outlining the appropriate donor tissue and placing the expected incisions within the relaxed skin tension lines where possible.    The area thus outlined was incised deep to adipose tissue with a #15 scalpel blade.  The skin margins were undermined to an appropriate distance in all directions around the primary defect and laterally outward around the island pedicle utilizing iris scissors.  There was minimal undermining beneath the pedicle flap.
Keystone Flap Text: The defect edges were debeveled with a #15 scalpel blade.  Given the location of the defect, shape of the defect a keystone flap was deemed most appropriate.  Using a sterile surgical marker, an appropriate keystone flap was drawn incorporating the defect, outlining the appropriate donor tissue and placing the expected incisions within the relaxed skin tension lines where possible. The area thus outlined was incised deep to adipose tissue with a #15 scalpel blade.  The skin margins were undermined to an appropriate distance in all directions around the primary defect and laterally outward around the flap utilizing iris scissors.
Banner Transposition Flap Text: The defect edges were debeveled with a #15 scalpel blade.  Given the location of the defect and the proximity to free margins a Banner transposition flap was deemed most appropriate.  Using a sterile surgical marker, an appropriate flap drawn around the defect. The area thus outlined was incised deep to adipose tissue with a #15 scalpel blade.  The skin margins were undermined to an appropriate distance in all directions utilizing iris scissors.
Purse String (Simple) Text: Given the location of the defect and the characteristics of the surrounding skin a purse string simple closure was deemed most appropriate.  Undermining was performed circumferentially around the surgical defect.  A purse string suture was then placed and tightened.
Estimated Blood Loss (Cc): minimal
Undermining Location (Optional): in the fascial plane
Tissue Cultured Epidermal Autograft Text: The defect edges were debeveled with a #15 scalpel blade.  Given the location of the defect, shape of the defect and the proximity to free margins a tissue cultured epidermal autograft was deemed most appropriate.  The graft was then trimmed to fit the size of the defect.  The graft was then placed in the primary defect and oriented appropriately.
Complex Repair And O-T Advancement Flap Text: The defect edges were debeveled with a #15 scalpel blade.  The primary defect was closed partially with a complex linear closure.  Given the location of the remaining defect, shape of the defect and the proximity to free margins an O-T advancement flap was deemed most appropriate for complete closure of the defect.  Using a sterile surgical marker, an appropriate advancement flap was drawn incorporating the defect and placing the expected incisions within the relaxed skin tension lines where possible.    The area thus outlined was incised deep to adipose tissue with a #15 scalpel blade.  The skin margins were undermined to an appropriate distance in all directions utilizing iris scissors.
No Repair - Repaired With Adjacent Surgical Defect Text (Leave Blank If You Do Not Want): After the excision the defect was repaired concurrently with another surgical defect which was in close approximation.
Island Pedicle Flap With Canthal Suspension Text: The defect edges were debeveled with a #15 scalpel blade.  Given the location of the defect, shape of the defect and the proximity to free margins an island pedicle advancement flap was deemed most appropriate.  Using a sterile surgical marker, an appropriate advancement flap was drawn incorporating the defect, outlining the appropriate donor tissue and placing the expected incisions within the relaxed skin tension lines where possible. The area thus outlined was incised deep to adipose tissue with a #15 scalpel blade.  The skin margins were undermined to an appropriate distance in all directions around the primary defect and laterally outward around the island pedicle utilizing iris scissors.  There was minimal undermining beneath the pedicle flap. A suspension suture was placed in the canthal tendon to prevent tension and prevent ectropion.
Advancement Flap (Single) Text: The defect edges were debeveled with a #15 scalpel blade.  Given the location of the defect and the proximity to free margins a single advancement flap was deemed most appropriate.  Using a sterile surgical marker, an appropriate advancement flap was drawn incorporating the defect and placing the expected incisions within the relaxed skin tension lines where possible.    The area thus outlined was incised deep to adipose tissue with a #15 scalpel blade.  The skin margins were undermined to an appropriate distance in all directions utilizing iris scissors.
Cheek Interpolation Flap Text: A decision was made to reconstruct the defect utilizing an interpolation axial flap and a staged reconstruction.  A telfa template was made of the defect.  This telfa template was then used to outline the Cheek Interpolation flap.  The donor area for the pedicle flap was then injected with anesthesia.  The flap was excised through the skin and subcutaneous tissue down to the layer of the underlying musculature.  The interpolation flap was carefully excised within this deep plane to maintain its blood supply.  The edges of the donor site were undermined.   The donor site was closed in a primary fashion.  The pedicle was then rotated into position and sutured.  Once the tube was sutured into place, adequate blood supply was confirmed with blanching and refill.  The pedicle was then wrapped with xeroform gauze and dressed appropriately with a telfa and gauze bandage to ensure continued blood supply and protect the attached pedicle.
Bilobed Transposition Flap Text: The defect edges were debeveled with a #15 scalpel blade.  Given the location of the defect and the proximity to free margins a bilobed transposition flap was deemed most appropriate.  Using a sterile surgical marker, an appropriate bilobe flap drawn around the defect.    The area thus outlined was incised deep to adipose tissue with a #15 scalpel blade.  The skin margins were undermined to an appropriate distance in all directions utilizing iris scissors.
Complex Repair And Skin Substitute Graft Text: The defect edges were debeveled with a #15 scalpel blade.  The primary defect was closed partially with a complex linear closure.  Given the location of the remaining defect, shape of the defect and the proximity to free margins a skin substitute graft was deemed most appropriate to repair the remaining defect.  The graft was trimmed to fit the size of the remaining defect.  The graft was then placed in the primary defect, oriented appropriately, and sutured into place.
H Plasty Text: Given the location of the defect, shape of the defect and the proximity to free margins a H-plasty was deemed most appropriate for repair.  Using a sterile surgical marker, the appropriate advancement arms of the H-plasty were drawn incorporating the defect and placing the expected incisions within the relaxed skin tension lines where possible. The area thus outlined was incised deep to adipose tissue with a #15 scalpel blade. The skin margins were undermined to an appropriate distance in all directions utilizing iris scissors.  The opposing advancement arms were then advanced into place in opposite direction and anchored with interrupted buried subcutaneous sutures.

## 2019-12-13 ENCOUNTER — APPOINTMENT (RX ONLY)
Dept: URBAN - METROPOLITAN AREA CLINIC 35 | Facility: CLINIC | Age: 39
Setting detail: DERMATOLOGY
End: 2019-12-13

## 2019-12-13 DIAGNOSIS — L90.5 SCAR CONDITIONS AND FIBROSIS OF SKIN: ICD-10-CM

## 2019-12-13 PROCEDURE — ? SUTURE REMOVAL (GLOBAL PERIOD)

## 2019-12-13 ASSESSMENT — LOCATION ZONE DERM: LOCATION ZONE: TRUNK

## 2019-12-13 ASSESSMENT — LOCATION SIMPLE DESCRIPTION DERM: LOCATION SIMPLE: ABDOMEN

## 2019-12-13 ASSESSMENT — LOCATION DETAILED DESCRIPTION DERM: LOCATION DETAILED: LEFT SUPERIOR FLANK

## 2019-12-13 NOTE — PROCEDURE: SUTURE REMOVAL (GLOBAL PERIOD)
Add 79355 Cpt? (Important Note: In 2017 The Use Of 72399 Is Being Tracked By Cms To Determine Future Global Period Reimbursement For Global Periods): no
Detail Level: Detailed

## 2019-12-31 ENCOUNTER — OFFICE VISIT (OUTPATIENT)
Dept: URGENT CARE | Facility: CLINIC | Age: 39
End: 2019-12-31
Payer: COMMERCIAL

## 2019-12-31 VITALS
HEIGHT: 63 IN | HEART RATE: 91 BPM | RESPIRATION RATE: 16 BRPM | TEMPERATURE: 99 F | OXYGEN SATURATION: 96 % | DIASTOLIC BLOOD PRESSURE: 68 MMHG | BODY MASS INDEX: 32.07 KG/M2 | SYSTOLIC BLOOD PRESSURE: 123 MMHG | WEIGHT: 181 LBS

## 2019-12-31 DIAGNOSIS — J11.1 INFLUENZA-LIKE ILLNESS: ICD-10-CM

## 2019-12-31 DIAGNOSIS — N89.8 VAGINAL ITCHING: ICD-10-CM

## 2019-12-31 LAB
APPEARANCE UR: NORMAL
BILIRUB UR STRIP-MCNC: NORMAL MG/DL
COLOR UR AUTO: YELLOW
GLUCOSE UR STRIP.AUTO-MCNC: NORMAL MG/DL
KETONES UR STRIP.AUTO-MCNC: NORMAL MG/DL
LEUKOCYTE ESTERASE UR QL STRIP.AUTO: NORMAL
NITRITE UR QL STRIP.AUTO: NORMAL
PH UR STRIP.AUTO: 6 [PH] (ref 5–8)
PROT UR QL STRIP: NORMAL MG/DL
RBC UR QL AUTO: NORMAL
SP GR UR STRIP.AUTO: 1
UROBILINOGEN UR STRIP-MCNC: 0.2 MG/DL

## 2019-12-31 PROCEDURE — 81002 URINALYSIS NONAUTO W/O SCOPE: CPT | Performed by: PHYSICIAN ASSISTANT

## 2019-12-31 PROCEDURE — 99203 OFFICE O/P NEW LOW 30 MIN: CPT | Performed by: PHYSICIAN ASSISTANT

## 2019-12-31 RX ORDER — FLUCONAZOLE 150 MG/1
150 TABLET ORAL DAILY
Qty: 1 TAB | Refills: 0 | Status: SHIPPED | OUTPATIENT
Start: 2019-12-31 | End: 2020-01-01

## 2019-12-31 ASSESSMENT — ENCOUNTER SYMPTOMS
DIZZINESS: 0
BODY ACHES: 1
ABDOMINAL PAIN: 0
MYALGIAS: 1
COUGH: 1
CHILLS: 1
EYE DISCHARGE: 0
SORE THROAT: 1
TINGLING: 0
NECK PAIN: 0
EYE REDNESS: 0
VOMITING: 0
HEADACHES: 1
WHEEZING: 0
CHANGE IN BOWEL HABIT: 0
FATIGUE: 1
SHORTNESS OF BREATH: 0
DIARRHEA: 0
SPUTUM PRODUCTION: 0

## 2019-12-31 NOTE — PROGRESS NOTES
"Subjective:      Theresa Caldwell is a 39 y.o. female who presents with Generalized Body Aches (sore throat, reduced appetite, burning eyes, possible yeast infection)            Generalized Body Aches   This is a new problem. Associated symptoms include chills, congestion, coughing, fatigue, headaches, myalgias and a sore throat. Pertinent negatives include no abdominal pain, change in bowel habit, chest pain, neck pain or vomiting.   Influenza   This is a new problem. Episode onset: 4 days ago. The problem has been waxing and waning. Associated symptoms include chills, congestion, coughing, fatigue, headaches, myalgias and a sore throat. Pertinent negatives include no abdominal pain, change in bowel habit, chest pain, neck pain or vomiting. Associated symptoms comments: Sore throat in the morning and night. None at this time.   . Nothing aggravates the symptoms. Treatments tried: OTC cold and flu formulations.        Review of Systems   Constitutional: Positive for chills, fatigue and malaise/fatigue.   HENT: Positive for congestion and sore throat. Negative for ear discharge and ear pain.    Eyes: Negative for discharge and redness.   Respiratory: Positive for cough. Negative for sputum production, shortness of breath and wheezing.         Rare sputum production.      Cardiovascular: Negative for chest pain.   Gastrointestinal: Negative for abdominal pain, change in bowel habit, diarrhea and vomiting.   Genitourinary: Negative for dysuria and urgency.        Reports slight vaginal itching.      Musculoskeletal: Positive for myalgias. Negative for neck pain.   Skin: Negative for itching.   Neurological: Positive for headaches. Negative for dizziness and tingling.   All other systems reviewed and are negative.         Objective:     /68 (BP Location: Right arm, Patient Position: Sitting, BP Cuff Size: Adult long)   Pulse 91   Temp 37.2 °C (99 °F)   Resp 16   Ht 1.6 m (5' 3\")   Wt 82.1 kg (181 lb)   " SpO2 96%   BMI 32.06 kg/m²    PMH:  has a past medical history of Other specified disorder of intestines and Urticaria, chronic.  MEDS: Reviewed .   ALLERGIES: No Known Allergies  SURGHX:   Past Surgical History:   Procedure Laterality Date   • VENTRAL HERNIA REPAIR  6/16/2011    Performed by CARIN LEIVA at SURGERY [facility]   • EYE SURGERY  2009    lasik bilat   • CERVICAL CONIZATION     • DENTAL EXTRACTION(S)      wisdom teeth   • TONSILLECTOMY AND ADENOIDECTOMY      as a child     SOCHX:  reports that she has never smoked. She has never used smokeless tobacco. She reports current alcohol use. She reports that she does not use drugs.  FH: Family history was reviewed, no pertinent findings to report    Physical Exam  Vitals signs reviewed.   Constitutional:       Appearance: Normal appearance. She is well-developed.   HENT:      Head: Normocephalic and atraumatic.      Ears:      Comments: Bilateral clear middle ear effusions.     Nose:      Comments: Rhinorrhea noted.     Mouth/Throat:      Comments: Posterior oropharynx is erythematous, positive postnasal drainage.  No evidence of exudate.Tonsils absent.     Eyes:      Conjunctiva/sclera: Conjunctivae normal.      Pupils: Pupils are equal, round, and reactive to light.   Neck:      Musculoskeletal: Normal range of motion and neck supple.   Cardiovascular:      Rate and Rhythm: Normal rate and regular rhythm.      Heart sounds: No murmur.   Pulmonary:      Effort: Pulmonary effort is normal. No respiratory distress.      Breath sounds: Normal breath sounds. No rhonchi.   Musculoskeletal: Normal range of motion.      Right lower leg: No edema.      Left lower leg: No edema.   Lymphadenopathy:      Cervical: No cervical adenopathy.   Skin:     General: Skin is warm.      Findings: No rash.   Neurological:      Mental Status: She is alert and oriented to person, place, and time.   Psychiatric:         Mood and Affect: Mood normal.         Behavior: Behavior  normal.         Thought Content: Thought content normal.               POC UA- clear.     Assessment/Plan:       1. Influenza-like illness  2. Vaginal itching  - POCT Urinalysis    Centor criteria- zero.   Patient is with classic flulike symptoms at this time.  Unfortunately her son is here with very similar symptoms as well suspect both have flu.  Symptoms began longer than 4 days ago of which testing was avoided at this time as this would not change the plan.  Discussed importance of supportive therapies, contagious nature of symptoms and complications of influenza.     Encouraged OTC supportive meds PRN. Humidification, increase fluids, avoid night time dairy.   Will write for Diflucan as she is with suspected yeast infection.     Patient given precautionary s/sx that mandate immediate follow up and evaluation in the ED. Advised of risks of not doing so.    DDX, Supportive care, and indications for immediate follow-up discussed with patient.    Instructed to return to clinic or nearest emergency department if we are not available for any change in condition, further concerns, or worsening of symptoms.    The patient demonstrated a good understanding and agreed with the treatment plan.

## 2020-10-29 ENCOUNTER — APPOINTMENT (RX ONLY)
Dept: URBAN - METROPOLITAN AREA CLINIC 35 | Facility: CLINIC | Age: 40
Setting detail: DERMATOLOGY
End: 2020-10-29

## 2020-10-29 DIAGNOSIS — D22 MELANOCYTIC NEVI: ICD-10-CM

## 2020-10-29 DIAGNOSIS — L82.1 OTHER SEBORRHEIC KERATOSIS: ICD-10-CM

## 2020-10-29 DIAGNOSIS — L11.1 TRANSIENT ACANTHOLYTIC DERMATOSIS [GROVER]: ICD-10-CM

## 2020-10-29 DIAGNOSIS — Z87.2 PERSONAL HISTORY OF DISEASES OF THE SKIN AND SUBCUTANEOUS TISSUE: ICD-10-CM

## 2020-10-29 DIAGNOSIS — L81.4 OTHER MELANIN HYPERPIGMENTATION: ICD-10-CM

## 2020-10-29 DIAGNOSIS — Z71.89 OTHER SPECIFIED COUNSELING: ICD-10-CM

## 2020-10-29 PROBLEM — D22.5 MELANOCYTIC NEVI OF TRUNK: Status: ACTIVE | Noted: 2020-10-29

## 2020-10-29 PROCEDURE — ? COUNSELING

## 2020-10-29 PROCEDURE — ? PRESCRIPTION

## 2020-10-29 PROCEDURE — 99213 OFFICE O/P EST LOW 20 MIN: CPT

## 2020-10-29 RX ORDER — TRIAMCINOLONE ACETONIDE 1 MG/G
CREAM TOPICAL BID
Qty: 1 | Refills: 1 | Status: ERX | COMMUNITY
Start: 2020-10-29

## 2020-10-29 RX ADMIN — TRIAMCINOLONE ACETONIDE THIN LAYER: 1 CREAM TOPICAL at 00:00

## 2020-10-29 ASSESSMENT — LOCATION DETAILED DESCRIPTION DERM
LOCATION DETAILED: RIGHT POSTERIOR SHOULDER
LOCATION DETAILED: RIGHT PROXIMAL LATERAL POSTERIOR THIGH
LOCATION DETAILED: LEFT RIB CAGE
LOCATION DETAILED: RIGHT SUPERIOR LATERAL UPPER BACK
LOCATION DETAILED: RIGHT LATERAL ABDOMEN
LOCATION DETAILED: LEFT SUPERIOR FLANK

## 2020-10-29 ASSESSMENT — LOCATION SIMPLE DESCRIPTION DERM
LOCATION SIMPLE: RIGHT SHOULDER
LOCATION SIMPLE: RIGHT POSTERIOR THIGH
LOCATION SIMPLE: RIGHT BACK
LOCATION SIMPLE: ABDOMEN

## 2020-10-29 ASSESSMENT — LOCATION ZONE DERM
LOCATION ZONE: TRUNK
LOCATION ZONE: LEG
LOCATION ZONE: ARM

## 2020-12-12 ENCOUNTER — HOSPITAL ENCOUNTER (OUTPATIENT)
Dept: RADIOLOGY | Facility: MEDICAL CENTER | Age: 40
End: 2020-12-12
Attending: OBSTETRICS & GYNECOLOGY
Payer: COMMERCIAL

## 2020-12-12 DIAGNOSIS — Z12.31 VISIT FOR SCREENING MAMMOGRAM: ICD-10-CM

## 2020-12-12 PROCEDURE — 77067 SCR MAMMO BI INCL CAD: CPT

## 2021-11-04 ENCOUNTER — APPOINTMENT (RX ONLY)
Dept: URBAN - METROPOLITAN AREA CLINIC 35 | Facility: CLINIC | Age: 41
Setting detail: DERMATOLOGY
End: 2021-11-04

## 2021-11-04 DIAGNOSIS — L11.1 TRANSIENT ACANTHOLYTIC DERMATOSIS [GROVER]: ICD-10-CM

## 2021-11-04 DIAGNOSIS — L81.4 OTHER MELANIN HYPERPIGMENTATION: ICD-10-CM

## 2021-11-04 DIAGNOSIS — D18.0 HEMANGIOMA: ICD-10-CM

## 2021-11-04 DIAGNOSIS — L82.1 OTHER SEBORRHEIC KERATOSIS: ICD-10-CM

## 2021-11-04 DIAGNOSIS — Z71.89 OTHER SPECIFIED COUNSELING: ICD-10-CM

## 2021-11-04 DIAGNOSIS — D22 MELANOCYTIC NEVI: ICD-10-CM

## 2021-11-04 DIAGNOSIS — Z87.2 PERSONAL HISTORY OF DISEASES OF THE SKIN AND SUBCUTANEOUS TISSUE: ICD-10-CM

## 2021-11-04 PROBLEM — D22.9 MELANOCYTIC NEVI, UNSPECIFIED: Status: ACTIVE | Noted: 2021-11-04

## 2021-11-04 PROBLEM — D18.01 HEMANGIOMA OF SKIN AND SUBCUTANEOUS TISSUE: Status: ACTIVE | Noted: 2021-11-04

## 2021-11-04 PROBLEM — D22.4 MELANOCYTIC NEVI OF SCALP AND NECK: Status: ACTIVE | Noted: 2021-11-04

## 2021-11-04 PROBLEM — D22.5 MELANOCYTIC NEVI OF TRUNK: Status: ACTIVE | Noted: 2021-11-04

## 2021-11-04 PROCEDURE — ? COUNSELING

## 2021-11-04 PROCEDURE — ? DEFER

## 2021-11-04 PROCEDURE — ? ADDITIONAL NOTES

## 2021-11-04 PROCEDURE — 99213 OFFICE O/P EST LOW 20 MIN: CPT

## 2021-11-04 ASSESSMENT — LOCATION SIMPLE DESCRIPTION DERM
LOCATION SIMPLE: ABDOMEN
LOCATION SIMPLE: LEFT PRETIBIAL REGION
LOCATION SIMPLE: RIGHT BACK
LOCATION SIMPLE: RIGHT SHOULDER
LOCATION SIMPLE: NECK
LOCATION SIMPLE: RIGHT POSTERIOR THIGH

## 2021-11-04 ASSESSMENT — LOCATION DETAILED DESCRIPTION DERM
LOCATION DETAILED: LEFT MEDIAL PROXIMAL PRETIBIAL REGION
LOCATION DETAILED: LEFT SUPERIOR FLANK
LOCATION DETAILED: LEFT RIB CAGE
LOCATION DETAILED: RIGHT SUPERIOR LATERAL UPPER BACK
LOCATION DETAILED: LEFT CENTRAL LATERAL NECK
LOCATION DETAILED: RIGHT POSTERIOR SHOULDER
LOCATION DETAILED: RIGHT PROXIMAL LATERAL POSTERIOR THIGH
LOCATION DETAILED: RIGHT LATERAL ABDOMEN

## 2021-11-04 ASSESSMENT — LOCATION ZONE DERM
LOCATION ZONE: NECK
LOCATION ZONE: TRUNK
LOCATION ZONE: ARM
LOCATION ZONE: LEG

## 2021-11-04 NOTE — PROCEDURE: DEFER
Introduction Text (Please End With A Colon): The following procedure was deferred:
Detail Level: Simple
Procedure To Be Performed At Next Visit: Shave Removal
Procedure To Be Performed At Next Visit: Electrodesiccation

## 2021-12-14 ENCOUNTER — APPOINTMENT (RX ONLY)
Dept: URBAN - METROPOLITAN AREA CLINIC 35 | Facility: CLINIC | Age: 41
Setting detail: DERMATOLOGY
End: 2021-12-14

## 2021-12-14 DIAGNOSIS — D18.0 HEMANGIOMA: ICD-10-CM

## 2021-12-14 DIAGNOSIS — D22 MELANOCYTIC NEVI: ICD-10-CM

## 2021-12-14 PROBLEM — D18.01 HEMANGIOMA OF SKIN AND SUBCUTANEOUS TISSUE: Status: ACTIVE | Noted: 2021-12-14

## 2021-12-14 PROBLEM — D22.4 MELANOCYTIC NEVI OF SCALP AND NECK: Status: ACTIVE | Noted: 2021-12-14

## 2021-12-14 PROCEDURE — 11305 SHAVE SKIN LESION 0.5 CM/<: CPT | Mod: 59

## 2021-12-14 PROCEDURE — ? BENIGN DESTRUCTION

## 2021-12-14 PROCEDURE — ? DEFER

## 2021-12-14 PROCEDURE — ? SHAVE REMOVAL

## 2021-12-14 PROCEDURE — 17110 DESTRUCTION B9 LES UP TO 14: CPT

## 2021-12-14 ASSESSMENT — LOCATION DETAILED DESCRIPTION DERM
LOCATION DETAILED: LEFT CENTRAL POSTERIOR NECK
LOCATION DETAILED: LEFT CENTRAL LATERAL NECK
LOCATION DETAILED: LEFT PROXIMAL PRETIBIAL REGION

## 2021-12-14 ASSESSMENT — LOCATION ZONE DERM
LOCATION ZONE: NECK
LOCATION ZONE: LEG

## 2021-12-14 ASSESSMENT — LOCATION SIMPLE DESCRIPTION DERM
LOCATION SIMPLE: NECK
LOCATION SIMPLE: LEFT PRETIBIAL REGION

## 2021-12-14 NOTE — PROCEDURE: BENIGN DESTRUCTION
Medical Necessity Information: It is in your best interest to select a reason for this procedure from the list below. All of these items fulfill various CMS LCD requirements except the new and changing color options.
Consent: The patient's consent was obtained including but not limited to risks of crusting, scabbing, blistering, scarring, darker or lighter pigmentary change, recurrence, incomplete removal and infection.
Render Note In Bullet Format When Appropriate: No
Treatment Number (Will Not Render If 0): 0
Anesthesia Volume In Cc: 0.5
Detail Level: Detailed
Medical Necessity Clause: This procedure was medically necessary because the lesions that were treated were:
Post-Care Instructions: I reviewed with the patient in detail post-care instructions. Patient is to wear sunprotection, and avoid picking at any of the treated lesions. Pt may apply Vaseline to crusted or scabbing areas.

## 2021-12-14 NOTE — PROCEDURE: SHAVE REMOVAL
Medical Necessity Information: It is in your best interest to select a reason for this procedure from the list below. All of these items fulfill various CMS LCD requirements except the new and changing color options.
Medical Necessity Clause: This procedure was medically necessary because the lesion that was treated was:
Lab: [id number]
Lab Facility: 
Detail Level: Detailed
Was A Bandage Applied: Yes
Size Of Lesion In Cm (Required): 0.5
X Size Of Lesion In Cm (Optional): 0
Biopsy Method: Dermablade
Anesthesia Type: 1% lidocaine with epinephrine
Hemostasis: Drysol
Wound Care: Petrolatum
Render Path Notes In Note?: No
Consent was obtained from the patient. The risks and benefits to therapy were discussed in detail. Specifically, the risks of infection, scarring, bleeding, prolonged wound healing, incomplete removal, allergy to anesthesia, nerve injury and recurrence were addressed. Prior to the procedure, the treatment site was clearly identified and confirmed by the patient. All components of Universal Protocol/PAUSE Rule completed.
Post-Care Instructions: I reviewed with the patient in detail post-care instructions. Patient is to keep the biopsy site dry overnight, and then apply bacitracin twice daily until healed. Patient may apply hydrogen peroxide soaks to remove any crusting.
Notification Instructions: Patient will be notified of pathology results. However, patient instructed to call the office if not contacted within 2 weeks.
Billing Type: Third-Party Bill

## 2022-04-19 ENCOUNTER — HOSPITAL ENCOUNTER (OUTPATIENT)
Dept: LAB | Facility: MEDICAL CENTER | Age: 42
End: 2022-04-19
Attending: OBSTETRICS & GYNECOLOGY
Payer: COMMERCIAL

## 2022-04-19 LAB
25(OH)D3 SERPL-MCNC: 41 NG/ML (ref 30–100)
ALBUMIN SERPL BCP-MCNC: 4.8 G/DL (ref 3.2–4.9)
ALBUMIN/GLOB SERPL: 1.9 G/DL
ALP SERPL-CCNC: 51 U/L (ref 30–99)
ALT SERPL-CCNC: 23 U/L (ref 2–50)
ANION GAP SERPL CALC-SCNC: 13 MMOL/L (ref 7–16)
AST SERPL-CCNC: 18 U/L (ref 12–45)
BILIRUB SERPL-MCNC: 0.4 MG/DL (ref 0.1–1.5)
BUN SERPL-MCNC: 14 MG/DL (ref 8–22)
CALCIUM SERPL-MCNC: 9.3 MG/DL (ref 8.5–10.5)
CHLORIDE SERPL-SCNC: 103 MMOL/L (ref 96–112)
CHOLEST SERPL-MCNC: 174 MG/DL (ref 100–199)
CO2 SERPL-SCNC: 22 MMOL/L (ref 20–33)
CREAT SERPL-MCNC: 0.92 MG/DL (ref 0.5–1.4)
CRP SERPL HS-MCNC: 1.4 MG/L (ref 0–3)
EST. AVERAGE GLUCOSE BLD GHB EST-MCNC: 88 MG/DL
ESTRADIOL SERPL-MCNC: 126 PG/ML
FASTING STATUS PATIENT QL REPORTED: NORMAL
FOLATE SERPL-MCNC: 19.3 NG/ML
FSH SERPL-ACNC: 2.3 MIU/ML
GFR SERPLBLD CREATININE-BSD FMLA CKD-EPI: 81 ML/MIN/1.73 M 2
GLOBULIN SER CALC-MCNC: 2.5 G/DL (ref 1.9–3.5)
GLUCOSE SERPL-MCNC: 89 MG/DL (ref 65–99)
HBA1C MFR BLD: 4.7 % (ref 4–5.6)
HCYS SERPL-SCNC: 5.47 UMOL/L
HDLC SERPL-MCNC: 57 MG/DL
LDLC SERPL CALC-MCNC: 97 MG/DL
LH SERPL-ACNC: 5.5 IU/L
POTASSIUM SERPL-SCNC: 4 MMOL/L (ref 3.6–5.5)
PROGEST SERPL-MCNC: 15.1 NG/ML
PROLACTIN SERPL-MCNC: 16.7 NG/ML (ref 2.8–26)
PROT SERPL-MCNC: 7.3 G/DL (ref 6–8.2)
SODIUM SERPL-SCNC: 138 MMOL/L (ref 135–145)
T3FREE SERPL-MCNC: 3.07 PG/ML (ref 2–4.4)
T4 FREE SERPL-MCNC: 1.26 NG/DL (ref 0.93–1.7)
TRIGL SERPL-MCNC: 99 MG/DL (ref 0–149)
TSH SERPL DL<=0.005 MIU/L-ACNC: 1.81 UIU/ML (ref 0.38–5.33)
VIT B12 SERPL-MCNC: 1255 PG/ML (ref 211–911)

## 2022-04-19 PROCEDURE — 84403 ASSAY OF TOTAL TESTOSTERONE: CPT

## 2022-04-19 PROCEDURE — 82306 VITAMIN D 25 HYDROXY: CPT

## 2022-04-19 PROCEDURE — 84146 ASSAY OF PROLACTIN: CPT

## 2022-04-19 PROCEDURE — 82746 ASSAY OF FOLIC ACID SERUM: CPT

## 2022-04-19 PROCEDURE — 80053 COMPREHEN METABOLIC PANEL: CPT

## 2022-04-19 PROCEDURE — 84481 FREE ASSAY (FT-3): CPT

## 2022-04-19 PROCEDURE — 82670 ASSAY OF TOTAL ESTRADIOL: CPT

## 2022-04-19 PROCEDURE — 84443 ASSAY THYROID STIM HORMONE: CPT

## 2022-04-19 PROCEDURE — 84482 T3 REVERSE: CPT

## 2022-04-19 PROCEDURE — 84402 ASSAY OF FREE TESTOSTERONE: CPT

## 2022-04-19 PROCEDURE — 86141 C-REACTIVE PROTEIN HS: CPT

## 2022-04-19 PROCEDURE — 83525 ASSAY OF INSULIN: CPT

## 2022-04-19 PROCEDURE — 82607 VITAMIN B-12: CPT

## 2022-04-19 PROCEDURE — 84144 ASSAY OF PROGESTERONE: CPT

## 2022-04-19 PROCEDURE — 80061 LIPID PANEL: CPT

## 2022-04-19 PROCEDURE — 84270 ASSAY OF SEX HORMONE GLOBUL: CPT

## 2022-04-19 PROCEDURE — 83001 ASSAY OF GONADOTROPIN (FSH): CPT

## 2022-04-19 PROCEDURE — 83036 HEMOGLOBIN GLYCOSYLATED A1C: CPT

## 2022-04-19 PROCEDURE — 83002 ASSAY OF GONADOTROPIN (LH): CPT

## 2022-04-19 PROCEDURE — 36415 COLL VENOUS BLD VENIPUNCTURE: CPT

## 2022-04-19 PROCEDURE — 84439 ASSAY OF FREE THYROXINE: CPT

## 2022-04-19 PROCEDURE — 83090 ASSAY OF HOMOCYSTEINE: CPT

## 2022-04-21 LAB — INSULIN P FAST SERPL-ACNC: 15 UIU/ML (ref 3–25)

## 2022-04-24 LAB — T3REVERSE SERPL-MCNC: 16.5 NG/DL (ref 9–27)

## 2022-05-03 LAB
SHBG SERPL-SCNC: 22 NMOL/L (ref 25–122)
TESTOST FREE SERPL-MCNC: 2.8 PG/ML (ref 1.3–9.2)
TESTOST SERPL-MCNC: 14 NG/DL (ref 9–55)

## 2022-11-10 ENCOUNTER — APPOINTMENT (RX ONLY)
Dept: URBAN - METROPOLITAN AREA CLINIC 35 | Facility: CLINIC | Age: 42
Setting detail: DERMATOLOGY
End: 2022-11-10

## 2022-11-10 DIAGNOSIS — D22 MELANOCYTIC NEVI: ICD-10-CM

## 2022-11-10 DIAGNOSIS — D18.0 HEMANGIOMA: ICD-10-CM

## 2022-11-10 DIAGNOSIS — Z87.2 PERSONAL HISTORY OF DISEASES OF THE SKIN AND SUBCUTANEOUS TISSUE: ICD-10-CM

## 2022-11-10 DIAGNOSIS — L82.1 OTHER SEBORRHEIC KERATOSIS: ICD-10-CM

## 2022-11-10 DIAGNOSIS — Z71.89 OTHER SPECIFIED COUNSELING: ICD-10-CM

## 2022-11-10 DIAGNOSIS — L81.4 OTHER MELANIN HYPERPIGMENTATION: ICD-10-CM

## 2022-11-10 PROBLEM — D18.01 HEMANGIOMA OF SKIN AND SUBCUTANEOUS TISSUE: Status: ACTIVE | Noted: 2022-11-10

## 2022-11-10 PROBLEM — D22.5 MELANOCYTIC NEVI OF TRUNK: Status: ACTIVE | Noted: 2022-11-10

## 2022-11-10 PROBLEM — D22.9 MELANOCYTIC NEVI, UNSPECIFIED: Status: ACTIVE | Noted: 2022-11-10

## 2022-11-10 PROCEDURE — ? ADDITIONAL NOTES

## 2022-11-10 PROCEDURE — ? COUNSELING

## 2022-11-10 PROCEDURE — 99213 OFFICE O/P EST LOW 20 MIN: CPT

## 2022-11-10 ASSESSMENT — LOCATION SIMPLE DESCRIPTION DERM
LOCATION SIMPLE: RIGHT SHOULDER
LOCATION SIMPLE: RIGHT BACK
LOCATION SIMPLE: ABDOMEN
LOCATION SIMPLE: SCALP
LOCATION SIMPLE: RIGHT POSTERIOR THIGH

## 2022-11-10 ASSESSMENT — LOCATION ZONE DERM
LOCATION ZONE: LEG
LOCATION ZONE: TRUNK
LOCATION ZONE: SCALP
LOCATION ZONE: ARM

## 2022-11-10 ASSESSMENT — LOCATION DETAILED DESCRIPTION DERM
LOCATION DETAILED: RIGHT LATERAL ABDOMEN
LOCATION DETAILED: RIGHT SUPERIOR LATERAL UPPER BACK
LOCATION DETAILED: LEFT SUPERIOR FLANK
LOCATION DETAILED: RIGHT POSTERIOR SHOULDER
LOCATION DETAILED: RIGHT PROXIMAL LATERAL POSTERIOR THIGH
LOCATION DETAILED: LEFT RIB CAGE
LOCATION DETAILED: RIGHT CENTRAL PARIETAL SCALP

## 2022-11-10 NOTE — PROCEDURE: ADDITIONAL NOTES
Additional Notes: Photo taken today
Render Risk Assessment In Note?: no
Detail Level: Simple
Additional Notes: Patient will return to clinic in January 2023 to get angioma on scalp removed.

## 2023-01-12 ENCOUNTER — APPOINTMENT (RX ONLY)
Dept: URBAN - METROPOLITAN AREA CLINIC 35 | Facility: CLINIC | Age: 43
Setting detail: DERMATOLOGY
End: 2023-01-12

## 2023-01-12 DIAGNOSIS — D18.0 HEMANGIOMA: ICD-10-CM

## 2023-01-12 PROBLEM — D18.01 HEMANGIOMA OF SKIN AND SUBCUTANEOUS TISSUE: Status: ACTIVE | Noted: 2023-01-12

## 2023-01-12 PROCEDURE — 17110 DESTRUCTION B9 LES UP TO 14: CPT

## 2023-01-12 PROCEDURE — ? BENIGN DESTRUCTION

## 2023-01-12 ASSESSMENT — LOCATION SIMPLE DESCRIPTION DERM
LOCATION SIMPLE: SCALP
LOCATION SIMPLE: RIGHT FOREHEAD

## 2023-01-12 ASSESSMENT — LOCATION DETAILED DESCRIPTION DERM
LOCATION DETAILED: RIGHT INFERIOR LATERAL FOREHEAD
LOCATION DETAILED: RIGHT CENTRAL PARIETAL SCALP

## 2023-01-12 ASSESSMENT — LOCATION ZONE DERM
LOCATION ZONE: SCALP
LOCATION ZONE: FACE

## 2023-01-12 NOTE — PROCEDURE: BENIGN DESTRUCTION
Post-Care Instructions: I reviewed with the patient in detail post-care instructions. Patient is to wear sunprotection, and avoid picking at any of the treated lesions. Pt may apply Vaseline to crusted or scabbing areas.
Anesthesia Volume In Cc: 0.5
Number Of Freeze-Thaw Cycles: 2 freeze-thaw cycles
Detail Level: Detailed
Treatment Number (Will Not Render If 0): 0
Medical Necessity Information: It is in your best interest to select a reason for this procedure from the list below. All of these items fulfill various CMS LCD requirements except the new and changing color options.
Render Post-Care Instructions In Note?: no
Medical Necessity Clause: This procedure was medically necessary because the lesions that were treated were:
Consent: The patient's consent was obtained including but not limited to risks of crusting, scabbing, blistering, scarring, darker or lighter pigmentary change, recurrence, incomplete removal and infection.

## 2023-02-27 ENCOUNTER — HOSPITAL ENCOUNTER (OUTPATIENT)
Dept: LAB | Facility: MEDICAL CENTER | Age: 43
End: 2023-02-27
Attending: OBSTETRICS & GYNECOLOGY
Payer: COMMERCIAL

## 2023-02-27 LAB
25(OH)D3 SERPL-MCNC: 36 NG/ML (ref 30–100)
ALBUMIN SERPL BCP-MCNC: 4.8 G/DL (ref 3.2–4.9)
ALBUMIN/GLOB SERPL: 1.8 G/DL
ALP SERPL-CCNC: 47 U/L (ref 30–99)
ALT SERPL-CCNC: 29 U/L (ref 2–50)
ANION GAP SERPL CALC-SCNC: 10 MMOL/L (ref 7–16)
AST SERPL-CCNC: 24 U/L (ref 12–45)
BASOPHILS # BLD AUTO: 0.2 % (ref 0–1.8)
BASOPHILS # BLD: 0.01 K/UL (ref 0–0.12)
BILIRUB SERPL-MCNC: 0.3 MG/DL (ref 0.1–1.5)
BUN SERPL-MCNC: 13 MG/DL (ref 8–22)
CALCIUM ALBUM COR SERPL-MCNC: 8.4 MG/DL (ref 8.5–10.5)
CALCIUM SERPL-MCNC: 9 MG/DL (ref 8.5–10.5)
CHLORIDE SERPL-SCNC: 108 MMOL/L (ref 96–112)
CHOLEST SERPL-MCNC: 165 MG/DL (ref 100–199)
CO2 SERPL-SCNC: 22 MMOL/L (ref 20–33)
CORTIS SERPL-MCNC: 2.4 UG/DL (ref 0–23)
CREAT SERPL-MCNC: 0.78 MG/DL (ref 0.5–1.4)
CRP SERPL HS-MCNC: 0.5 MG/L (ref 0–3)
DHEA-S SERPL-MCNC: 63.5 UG/DL (ref 60.9–337)
EOSINOPHIL # BLD AUTO: 0.07 K/UL (ref 0–0.51)
EOSINOPHIL NFR BLD: 1.2 % (ref 0–6.9)
ERYTHROCYTE [DISTWIDTH] IN BLOOD BY AUTOMATED COUNT: 40.6 FL (ref 35.9–50)
EST. AVERAGE GLUCOSE BLD GHB EST-MCNC: 105 MG/DL
ESTRADIOL SERPL-MCNC: 117 PG/ML
FERRITIN SERPL-MCNC: 104 NG/ML (ref 10–291)
FSH SERPL-ACNC: 3 MIU/ML
GFR SERPLBLD CREATININE-BSD FMLA CKD-EPI: 98 ML/MIN/1.73 M 2
GLOBULIN SER CALC-MCNC: 2.6 G/DL (ref 1.9–3.5)
GLUCOSE SERPL-MCNC: 84 MG/DL (ref 65–99)
HBA1C MFR BLD: 5.3 % (ref 4–5.6)
HCT VFR BLD AUTO: 44.9 % (ref 37–47)
HCYS SERPL-SCNC: 7.08 UMOL/L
HDLC SERPL-MCNC: 46 MG/DL
HGB BLD-MCNC: 15.2 G/DL (ref 12–16)
IMM GRANULOCYTES # BLD AUTO: 0.03 K/UL (ref 0–0.11)
IMM GRANULOCYTES NFR BLD AUTO: 0.5 % (ref 0–0.9)
IRON SATN MFR SERPL: 26 % (ref 15–55)
IRON SERPL-MCNC: 73 UG/DL (ref 40–170)
LDLC SERPL CALC-MCNC: 99 MG/DL
LH SERPL-ACNC: 4.4 IU/L
LYMPHOCYTES # BLD AUTO: 2.12 K/UL (ref 1–4.8)
LYMPHOCYTES NFR BLD: 35.3 % (ref 22–41)
MAGNESIUM SERPL-MCNC: 1.8 MG/DL (ref 1.5–2.5)
MCH RBC QN AUTO: 30.7 PG (ref 27–33)
MCHC RBC AUTO-ENTMCNC: 33.9 G/DL (ref 33.6–35)
MCV RBC AUTO: 90.7 FL (ref 81.4–97.8)
MONOCYTES # BLD AUTO: 0.36 K/UL (ref 0–0.85)
MONOCYTES NFR BLD AUTO: 6 % (ref 0–13.4)
NEUTROPHILS # BLD AUTO: 3.41 K/UL (ref 2–7.15)
NEUTROPHILS NFR BLD: 56.8 % (ref 44–72)
NRBC # BLD AUTO: 0 K/UL
NRBC BLD-RTO: 0 /100 WBC
PLATELET # BLD AUTO: 311 K/UL (ref 164–446)
PMV BLD AUTO: 9.4 FL (ref 9–12.9)
POTASSIUM SERPL-SCNC: 4.3 MMOL/L (ref 3.6–5.5)
PROGEST SERPL-MCNC: 11.4 NG/ML
PROT SERPL-MCNC: 7.4 G/DL (ref 6–8.2)
RBC # BLD AUTO: 4.95 M/UL (ref 4.2–5.4)
SODIUM SERPL-SCNC: 140 MMOL/L (ref 135–145)
T3 SERPL-MCNC: 91.8 NG/DL (ref 60–181)
T3FREE SERPL-MCNC: 2.5 PG/ML (ref 2–4.4)
T4 FREE SERPL-MCNC: 1.35 NG/DL (ref 0.93–1.7)
T4 SERPL-MCNC: 6.7 UG/DL (ref 4–12)
THYROPEROXIDASE AB SERPL-ACNC: 11 IU/ML (ref 0–9)
TIBC SERPL-MCNC: 283 UG/DL (ref 250–450)
TRIGL SERPL-MCNC: 99 MG/DL (ref 0–149)
TSH SERPL DL<=0.005 MIU/L-ACNC: 1.39 UIU/ML (ref 0.38–5.33)
UIBC SERPL-MCNC: 210 UG/DL (ref 110–370)
VIT B12 SERPL-MCNC: 610 PG/ML (ref 211–911)
WBC # BLD AUTO: 6 K/UL (ref 4.8–10.8)

## 2023-02-27 PROCEDURE — 83525 ASSAY OF INSULIN: CPT

## 2023-02-27 PROCEDURE — 82670 ASSAY OF TOTAL ESTRADIOL: CPT

## 2023-02-27 PROCEDURE — 80053 COMPREHEN METABOLIC PANEL: CPT

## 2023-02-27 PROCEDURE — 84443 ASSAY THYROID STIM HORMONE: CPT

## 2023-02-27 PROCEDURE — 84480 ASSAY TRIIODOTHYRONINE (T3): CPT

## 2023-02-27 PROCEDURE — 83002 ASSAY OF GONADOTROPIN (LH): CPT

## 2023-02-27 PROCEDURE — 83036 HEMOGLOBIN GLYCOSYLATED A1C: CPT

## 2023-02-27 PROCEDURE — 84144 ASSAY OF PROGESTERONE: CPT

## 2023-02-27 PROCEDURE — 85025 COMPLETE CBC W/AUTO DIFF WBC: CPT

## 2023-02-27 PROCEDURE — 86141 C-REACTIVE PROTEIN HS: CPT

## 2023-02-27 PROCEDURE — 82533 TOTAL CORTISOL: CPT

## 2023-02-27 PROCEDURE — 84439 ASSAY OF FREE THYROXINE: CPT

## 2023-02-27 PROCEDURE — 84482 T3 REVERSE: CPT

## 2023-02-27 PROCEDURE — 83735 ASSAY OF MAGNESIUM: CPT

## 2023-02-27 PROCEDURE — 36415 COLL VENOUS BLD VENIPUNCTURE: CPT

## 2023-02-27 PROCEDURE — 83090 ASSAY OF HOMOCYSTEINE: CPT

## 2023-02-27 PROCEDURE — 84402 ASSAY OF FREE TESTOSTERONE: CPT

## 2023-02-27 PROCEDURE — 84305 ASSAY OF SOMATOMEDIN: CPT

## 2023-02-27 PROCEDURE — 82627 DEHYDROEPIANDROSTERONE: CPT

## 2023-02-27 PROCEDURE — 83550 IRON BINDING TEST: CPT

## 2023-02-27 PROCEDURE — 86376 MICROSOMAL ANTIBODY EACH: CPT

## 2023-02-27 PROCEDURE — 82607 VITAMIN B-12: CPT

## 2023-02-27 PROCEDURE — 84481 FREE ASSAY (FT-3): CPT

## 2023-02-27 PROCEDURE — 82306 VITAMIN D 25 HYDROXY: CPT

## 2023-02-27 PROCEDURE — 80061 LIPID PANEL: CPT

## 2023-02-27 PROCEDURE — 83540 ASSAY OF IRON: CPT

## 2023-02-27 PROCEDURE — 82728 ASSAY OF FERRITIN: CPT

## 2023-02-27 PROCEDURE — 84270 ASSAY OF SEX HORMONE GLOBUL: CPT

## 2023-02-27 PROCEDURE — 84403 ASSAY OF TOTAL TESTOSTERONE: CPT

## 2023-02-27 PROCEDURE — 82642 DIHYDROTESTOSTERONE: CPT

## 2023-02-27 PROCEDURE — 83001 ASSAY OF GONADOTROPIN (FSH): CPT

## 2023-03-01 LAB
IGF-I SERPL-MCNC: 204 NG/ML (ref 76–271)
IGF-I Z-SCORE SERPL: 1
INSULIN P FAST SERPL-ACNC: 9 UIU/ML (ref 3–25)

## 2023-03-02 LAB — ANDROSTANOLONE SERPL-MCNC: 33.4 PG/ML (ref 24–208)

## 2023-03-04 LAB — T3REVERSE SERPL-MCNC: 19.3 NG/DL (ref 9–27)

## 2023-03-08 LAB
SHBG SERPL-SCNC: 32 NMOL/L (ref 25–122)
TESTOST FREE SERPL-MCNC: 2.2 PG/ML (ref 1.3–9.2)
TESTOST SERPL-MCNC: 13 NG/DL (ref 9–55)

## 2023-06-28 ENCOUNTER — HOSPITAL ENCOUNTER (OUTPATIENT)
Dept: LAB | Facility: MEDICAL CENTER | Age: 43
End: 2023-06-28
Attending: OBSTETRICS & GYNECOLOGY
Payer: COMMERCIAL

## 2023-06-28 LAB
ALBUMIN SERPL BCP-MCNC: 4.9 G/DL (ref 3.2–4.9)
ALBUMIN/GLOB SERPL: 1.9 G/DL
ALP SERPL-CCNC: 55 U/L (ref 30–99)
ALT SERPL-CCNC: 21 U/L (ref 2–50)
ANION GAP SERPL CALC-SCNC: 14 MMOL/L (ref 7–16)
AST SERPL-CCNC: 21 U/L (ref 12–45)
BILIRUB SERPL-MCNC: 0.4 MG/DL (ref 0.1–1.5)
BUN SERPL-MCNC: 10 MG/DL (ref 8–22)
CALCIUM ALBUM COR SERPL-MCNC: 8.9 MG/DL (ref 8.5–10.5)
CALCIUM SERPL-MCNC: 9.6 MG/DL (ref 8.5–10.5)
CHLORIDE SERPL-SCNC: 103 MMOL/L (ref 96–112)
CO2 SERPL-SCNC: 22 MMOL/L (ref 20–33)
CREAT SERPL-MCNC: 0.81 MG/DL (ref 0.5–1.4)
EST. AVERAGE GLUCOSE BLD GHB EST-MCNC: 103 MG/DL
GFR SERPLBLD CREATININE-BSD FMLA CKD-EPI: 93 ML/MIN/1.73 M 2
GLOBULIN SER CALC-MCNC: 2.6 G/DL (ref 1.9–3.5)
GLUCOSE SERPL-MCNC: 82 MG/DL (ref 65–99)
HBA1C MFR BLD: 5.2 % (ref 4–5.6)
POTASSIUM SERPL-SCNC: 4.4 MMOL/L (ref 3.6–5.5)
PROT SERPL-MCNC: 7.5 G/DL (ref 6–8.2)
SODIUM SERPL-SCNC: 139 MMOL/L (ref 135–145)
T3 SERPL-MCNC: 119 NG/DL (ref 60–181)
T3FREE SERPL-MCNC: 3.64 PG/ML (ref 2–4.4)
T4 FREE SERPL-MCNC: 1.34 NG/DL (ref 0.93–1.7)
T4 SERPL-MCNC: 7 UG/DL (ref 4–12)
TSH SERPL DL<=0.005 MIU/L-ACNC: 0.65 UIU/ML (ref 0.38–5.33)

## 2023-06-28 PROCEDURE — 84439 ASSAY OF FREE THYROXINE: CPT

## 2023-06-28 PROCEDURE — 84481 FREE ASSAY (FT-3): CPT

## 2023-06-28 PROCEDURE — 83525 ASSAY OF INSULIN: CPT

## 2023-06-28 PROCEDURE — 80053 COMPREHEN METABOLIC PANEL: CPT

## 2023-06-28 PROCEDURE — 84480 ASSAY TRIIODOTHYRONINE (T3): CPT

## 2023-06-28 PROCEDURE — 83036 HEMOGLOBIN GLYCOSYLATED A1C: CPT

## 2023-06-28 PROCEDURE — 84443 ASSAY THYROID STIM HORMONE: CPT

## 2023-06-28 PROCEDURE — 36415 COLL VENOUS BLD VENIPUNCTURE: CPT

## 2023-06-30 LAB — INSULIN P FAST SERPL-ACNC: 10 UIU/ML (ref 3–25)

## 2023-11-16 ENCOUNTER — APPOINTMENT (RX ONLY)
Dept: URBAN - METROPOLITAN AREA CLINIC 35 | Facility: CLINIC | Age: 43
Setting detail: DERMATOLOGY
End: 2023-11-16

## 2023-11-16 DIAGNOSIS — Z71.89 OTHER SPECIFIED COUNSELING: ICD-10-CM

## 2023-11-16 DIAGNOSIS — Z87.2 PERSONAL HISTORY OF DISEASES OF THE SKIN AND SUBCUTANEOUS TISSUE: ICD-10-CM

## 2023-11-16 DIAGNOSIS — D22 MELANOCYTIC NEVI: ICD-10-CM

## 2023-11-16 DIAGNOSIS — L82.1 OTHER SEBORRHEIC KERATOSIS: ICD-10-CM

## 2023-11-16 DIAGNOSIS — L81.4 OTHER MELANIN HYPERPIGMENTATION: ICD-10-CM

## 2023-11-16 DIAGNOSIS — D18.0 HEMANGIOMA: ICD-10-CM

## 2023-11-16 PROBLEM — D18.01 HEMANGIOMA OF SKIN AND SUBCUTANEOUS TISSUE: Status: ACTIVE | Noted: 2023-11-16

## 2023-11-16 PROBLEM — D22.5 MELANOCYTIC NEVI OF TRUNK: Status: ACTIVE | Noted: 2023-11-16

## 2023-11-16 PROBLEM — D22.9 MELANOCYTIC NEVI, UNSPECIFIED: Status: ACTIVE | Noted: 2023-11-16

## 2023-11-16 PROCEDURE — ? ADDITIONAL NOTES

## 2023-11-16 PROCEDURE — ? COUNSELING

## 2023-11-16 PROCEDURE — 99213 OFFICE O/P EST LOW 20 MIN: CPT

## 2023-11-16 ASSESSMENT — LOCATION DETAILED DESCRIPTION DERM
LOCATION DETAILED: RIGHT LATERAL ABDOMEN
LOCATION DETAILED: RIGHT POSTERIOR SHOULDER
LOCATION DETAILED: RIGHT SUPERIOR LATERAL UPPER BACK
LOCATION DETAILED: RIGHT PROXIMAL LATERAL POSTERIOR THIGH
LOCATION DETAILED: LEFT SUPERIOR FLANK
LOCATION DETAILED: RIGHT CENTRAL PARIETAL SCALP
LOCATION DETAILED: LEFT RIB CAGE

## 2023-11-16 ASSESSMENT — LOCATION SIMPLE DESCRIPTION DERM
LOCATION SIMPLE: RIGHT POSTERIOR THIGH
LOCATION SIMPLE: RIGHT BACK
LOCATION SIMPLE: RIGHT SHOULDER
LOCATION SIMPLE: ABDOMEN
LOCATION SIMPLE: SCALP

## 2023-11-16 ASSESSMENT — LOCATION ZONE DERM
LOCATION ZONE: TRUNK
LOCATION ZONE: LEG
LOCATION ZONE: SCALP
LOCATION ZONE: ARM

## 2023-12-15 RX ORDER — VALACYCLOVIR HYDROCHLORIDE 1 G/1
TABLET, FILM COATED ORAL
Qty: 12 | Refills: 3 | Status: ERX

## 2024-01-06 ENCOUNTER — HOSPITAL ENCOUNTER (OUTPATIENT)
Dept: LAB | Facility: MEDICAL CENTER | Age: 44
End: 2024-01-06
Attending: OBSTETRICS & GYNECOLOGY
Payer: COMMERCIAL

## 2024-01-06 LAB
25(OH)D3 SERPL-MCNC: 37 NG/ML (ref 30–100)
ALBUMIN SERPL BCP-MCNC: 5.1 G/DL (ref 3.2–4.9)
ALBUMIN/GLOB SERPL: 1.8 G/DL
ALP SERPL-CCNC: 51 U/L (ref 30–99)
ALT SERPL-CCNC: 16 U/L (ref 2–50)
ANION GAP SERPL CALC-SCNC: 13 MMOL/L (ref 7–16)
AST SERPL-CCNC: 18 U/L (ref 12–45)
BASOPHILS # BLD AUTO: 0.2 % (ref 0–1.8)
BASOPHILS # BLD: 0.01 K/UL (ref 0–0.12)
BILIRUB SERPL-MCNC: 0.4 MG/DL (ref 0.1–1.5)
BUN SERPL-MCNC: 14 MG/DL (ref 8–22)
CALCIUM ALBUM COR SERPL-MCNC: 8.5 MG/DL (ref 8.5–10.5)
CALCIUM SERPL-MCNC: 9.4 MG/DL (ref 8.5–10.5)
CHLORIDE SERPL-SCNC: 104 MMOL/L (ref 96–112)
CHOLEST SERPL-MCNC: 209 MG/DL (ref 100–199)
CO2 SERPL-SCNC: 22 MMOL/L (ref 20–33)
CREAT SERPL-MCNC: 0.83 MG/DL (ref 0.5–1.4)
CRP SERPL HS-MCNC: 1.1 MG/L (ref 0–3)
EOSINOPHIL # BLD AUTO: 0.09 K/UL (ref 0–0.51)
EOSINOPHIL NFR BLD: 1.7 % (ref 0–6.9)
ERYTHROCYTE [DISTWIDTH] IN BLOOD BY AUTOMATED COUNT: 41.2 FL (ref 35.9–50)
EST. AVERAGE GLUCOSE BLD GHB EST-MCNC: 97 MG/DL
ESTRADIOL SERPL-MCNC: 104 PG/ML
FASTING STATUS PATIENT QL REPORTED: NORMAL
FERRITIN SERPL-MCNC: 94.9 NG/ML (ref 10–291)
FOLATE SERPL-MCNC: 13.7 NG/ML
FSH SERPL-ACNC: 2.5 MIU/ML
GFR SERPLBLD CREATININE-BSD FMLA CKD-EPI: 90 ML/MIN/1.73 M 2
GLOBULIN SER CALC-MCNC: 2.8 G/DL (ref 1.9–3.5)
GLUCOSE SERPL-MCNC: 78 MG/DL (ref 65–99)
HBA1C MFR BLD: 5 % (ref 4–5.6)
HCT VFR BLD AUTO: 43.3 % (ref 37–47)
HCYS SERPL-SCNC: 7.62 UMOL/L
HDLC SERPL-MCNC: 64 MG/DL
HGB BLD-MCNC: 14.7 G/DL (ref 12–16)
IMM GRANULOCYTES # BLD AUTO: 0.01 K/UL (ref 0–0.11)
IMM GRANULOCYTES NFR BLD AUTO: 0.2 % (ref 0–0.9)
IRON SATN MFR SERPL: 33 % (ref 15–55)
IRON SERPL-MCNC: 97 UG/DL (ref 40–170)
LDLC SERPL CALC-MCNC: 131 MG/DL
LH SERPL-ACNC: 1.5 IU/L
LYMPHOCYTES # BLD AUTO: 1.79 K/UL (ref 1–4.8)
LYMPHOCYTES NFR BLD: 34.1 % (ref 22–41)
MCH RBC QN AUTO: 30.2 PG (ref 27–33)
MCHC RBC AUTO-ENTMCNC: 33.9 G/DL (ref 32.2–35.5)
MCV RBC AUTO: 89.1 FL (ref 81.4–97.8)
MONOCYTES # BLD AUTO: 0.32 K/UL (ref 0–0.85)
MONOCYTES NFR BLD AUTO: 6.1 % (ref 0–13.4)
NEUTROPHILS # BLD AUTO: 3.03 K/UL (ref 1.82–7.42)
NEUTROPHILS NFR BLD: 57.7 % (ref 44–72)
NRBC # BLD AUTO: 0 K/UL
NRBC BLD-RTO: 0 /100 WBC (ref 0–0.2)
PLATELET # BLD AUTO: 304 K/UL (ref 164–446)
PMV BLD AUTO: 9.1 FL (ref 9–12.9)
POTASSIUM SERPL-SCNC: 4.3 MMOL/L (ref 3.6–5.5)
PROGEST SERPL-MCNC: 9.76 NG/ML
PROT SERPL-MCNC: 7.9 G/DL (ref 6–8.2)
RBC # BLD AUTO: 4.86 M/UL (ref 4.2–5.4)
SODIUM SERPL-SCNC: 139 MMOL/L (ref 135–145)
T3 SERPL-MCNC: 93.1 NG/DL (ref 60–181)
T3FREE SERPL-MCNC: 3.24 PG/ML (ref 2–4.4)
T4 FREE SERPL-MCNC: 1.36 NG/DL (ref 0.93–1.7)
T4 SERPL-MCNC: 7.6 UG/DL (ref 4–12)
TIBC SERPL-MCNC: 298 UG/DL (ref 250–450)
TRIGL SERPL-MCNC: 69 MG/DL (ref 0–149)
TSH SERPL DL<=0.005 MIU/L-ACNC: 0.99 UIU/ML (ref 0.38–5.33)
UIBC SERPL-MCNC: 201 UG/DL (ref 110–370)
VIT B12 SERPL-MCNC: 743 PG/ML (ref 211–911)
WBC # BLD AUTO: 5.3 K/UL (ref 4.8–10.8)

## 2024-01-06 PROCEDURE — 82728 ASSAY OF FERRITIN: CPT

## 2024-01-06 PROCEDURE — 85025 COMPLETE CBC W/AUTO DIFF WBC: CPT

## 2024-01-06 PROCEDURE — 84144 ASSAY OF PROGESTERONE: CPT

## 2024-01-06 PROCEDURE — 83001 ASSAY OF GONADOTROPIN (FSH): CPT

## 2024-01-06 PROCEDURE — 81401 MOPATH PROCEDURE LEVEL 2: CPT

## 2024-01-06 PROCEDURE — 82746 ASSAY OF FOLIC ACID SERUM: CPT

## 2024-01-06 PROCEDURE — 82172 ASSAY OF APOLIPOPROTEIN: CPT

## 2024-01-06 PROCEDURE — 84439 ASSAY OF FREE THYROXINE: CPT

## 2024-01-06 PROCEDURE — 83002 ASSAY OF GONADOTROPIN (LH): CPT

## 2024-01-06 PROCEDURE — 80053 COMPREHEN METABOLIC PANEL: CPT

## 2024-01-06 PROCEDURE — 82607 VITAMIN B-12: CPT

## 2024-01-06 PROCEDURE — 83520 IMMUNOASSAY QUANT NOS NONAB: CPT

## 2024-01-06 PROCEDURE — 84403 ASSAY OF TOTAL TESTOSTERONE: CPT

## 2024-01-06 PROCEDURE — 84481 FREE ASSAY (FT-3): CPT

## 2024-01-06 PROCEDURE — 82306 VITAMIN D 25 HYDROXY: CPT

## 2024-01-06 PROCEDURE — 83090 ASSAY OF HOMOCYSTEINE: CPT

## 2024-01-06 PROCEDURE — 36415 COLL VENOUS BLD VENIPUNCTURE: CPT

## 2024-01-06 PROCEDURE — 84270 ASSAY OF SEX HORMONE GLOBUL: CPT

## 2024-01-06 PROCEDURE — 83695 ASSAY OF LIPOPROTEIN(A): CPT

## 2024-01-06 PROCEDURE — 86141 C-REACTIVE PROTEIN HS: CPT

## 2024-01-06 PROCEDURE — 83550 IRON BINDING TEST: CPT

## 2024-01-06 PROCEDURE — 84443 ASSAY THYROID STIM HORMONE: CPT

## 2024-01-06 PROCEDURE — 84480 ASSAY TRIIODOTHYRONINE (T3): CPT

## 2024-01-06 PROCEDURE — 83036 HEMOGLOBIN GLYCOSYLATED A1C: CPT

## 2024-01-06 PROCEDURE — 82670 ASSAY OF TOTAL ESTRADIOL: CPT

## 2024-01-06 PROCEDURE — 80061 LIPID PANEL: CPT

## 2024-01-06 PROCEDURE — 84402 ASSAY OF FREE TESTOSTERONE: CPT

## 2024-01-06 PROCEDURE — 83540 ASSAY OF IRON: CPT

## 2024-01-06 PROCEDURE — 83525 ASSAY OF INSULIN: CPT

## 2024-01-08 LAB
APO B100 SERPL-MCNC: 98 MG/DL (ref 60–117)
INSULIN P FAST SERPL-ACNC: 13 UIU/ML (ref 3–25)
LPA SERPL-MCNC: <6 MG/DL

## 2024-01-11 LAB
SHBG SERPL-SCNC: 28 NMOL/L (ref 25–122)
TESTOST FREE SERPL-MCNC: 2 PG/ML (ref 1.1–5.8)
TESTOST SERPL-MCNC: 11 NG/DL (ref 9–55)

## 2024-01-12 LAB — TEST NAME 95000: NORMAL

## 2024-01-13 LAB — TEST NAME 95000: NORMAL

## 2024-04-08 ENCOUNTER — RX ONLY (OUTPATIENT)
Age: 44
Setting detail: RX ONLY
End: 2024-04-08

## 2024-04-08 RX ORDER — CETIRIZINE HYDROCHLORIDE 10 MG/1
1 TABLET, FILM COATED ORAL QD
Qty: 90 | Refills: 2 | Status: ERX | COMMUNITY
Start: 2024-04-08

## 2024-04-08 RX ORDER — CETIRIZINE HYDROCHLORIDE 10 MG/1
THIN LAYER TABLET, FILM COATED ORAL QD
Qty: 30 | Refills: 0 | Status: CANCELLED
Stop reason: CLARIF

## 2024-04-09 ENCOUNTER — RX ONLY (OUTPATIENT)
Age: 44
Setting detail: RX ONLY
End: 2024-04-09

## 2024-04-09 RX ORDER — CETIRIZINE HYDROCHLORIDE 10 MG/1
1 TABLET, FILM COATED ORAL QD
Qty: 90 | Refills: 3 | Status: ERX

## 2024-04-23 ENCOUNTER — HOSPITAL ENCOUNTER (OUTPATIENT)
Dept: LAB | Facility: MEDICAL CENTER | Age: 44
End: 2024-04-23
Attending: OBSTETRICS & GYNECOLOGY
Payer: COMMERCIAL

## 2024-04-23 LAB
ALBUMIN SERPL BCP-MCNC: 5 G/DL (ref 3.2–4.9)
ALBUMIN/GLOB SERPL: 1.7 G/DL
ALP SERPL-CCNC: 48 U/L (ref 30–99)
ALT SERPL-CCNC: 18 U/L (ref 2–50)
ANION GAP SERPL CALC-SCNC: 12 MMOL/L (ref 7–16)
AST SERPL-CCNC: 21 U/L (ref 12–45)
BILIRUB SERPL-MCNC: 0.4 MG/DL (ref 0.1–1.5)
BUN SERPL-MCNC: 10 MG/DL (ref 8–22)
CALCIUM ALBUM COR SERPL-MCNC: 8.7 MG/DL (ref 8.5–10.5)
CALCIUM SERPL-MCNC: 9.5 MG/DL (ref 8.5–10.5)
CHLORIDE SERPL-SCNC: 105 MMOL/L (ref 96–112)
CO2 SERPL-SCNC: 22 MMOL/L (ref 20–33)
CREAT SERPL-MCNC: 0.9 MG/DL (ref 0.5–1.4)
DHEA-S SERPL-MCNC: 120 UG/DL (ref 60.9–337)
ESTRADIOL SERPL-MCNC: 38 PG/ML
FSH SERPL-ACNC: 8.8 MIU/ML
GFR SERPLBLD CREATININE-BSD FMLA CKD-EPI: 82 ML/MIN/1.73 M 2
GLOBULIN SER CALC-MCNC: 2.9 G/DL (ref 1.9–3.5)
GLUCOSE SERPL-MCNC: 82 MG/DL (ref 65–99)
LH SERPL-ACNC: 7 IU/L
POTASSIUM SERPL-SCNC: 4.1 MMOL/L (ref 3.6–5.5)
PROGEST SERPL-MCNC: 0.88 NG/ML
PROT SERPL-MCNC: 7.9 G/DL (ref 6–8.2)
SODIUM SERPL-SCNC: 139 MMOL/L (ref 135–145)

## 2024-04-23 PROCEDURE — 36415 COLL VENOUS BLD VENIPUNCTURE: CPT

## 2024-04-23 PROCEDURE — 83525 ASSAY OF INSULIN: CPT

## 2024-04-23 PROCEDURE — 83001 ASSAY OF GONADOTROPIN (FSH): CPT

## 2024-04-23 PROCEDURE — 82627 DEHYDROEPIANDROSTERONE: CPT

## 2024-04-23 PROCEDURE — 84402 ASSAY OF FREE TESTOSTERONE: CPT

## 2024-04-23 PROCEDURE — 84144 ASSAY OF PROGESTERONE: CPT

## 2024-04-23 PROCEDURE — 82670 ASSAY OF TOTAL ESTRADIOL: CPT

## 2024-04-23 PROCEDURE — 83002 ASSAY OF GONADOTROPIN (LH): CPT

## 2024-04-23 PROCEDURE — 84270 ASSAY OF SEX HORMONE GLOBUL: CPT

## 2024-04-23 PROCEDURE — 80053 COMPREHEN METABOLIC PANEL: CPT

## 2024-04-23 PROCEDURE — 84403 ASSAY OF TOTAL TESTOSTERONE: CPT

## 2024-04-25 LAB
INSULIN P FAST SERPL-ACNC: 15 UIU/ML (ref 3–25)
SHBG SERPL-SCNC: 30 NMOL/L (ref 25–122)
TESTOST FREE MFR SERPL: 1.7 %
TESTOST FREE SERPL-MCNC: 1 PG/ML
TESTOST SERPL-MCNC: 6 NG/DL

## 2024-07-26 ENCOUNTER — HOSPITAL ENCOUNTER (OUTPATIENT)
Dept: LAB | Facility: MEDICAL CENTER | Age: 44
End: 2024-07-26
Attending: OBSTETRICS & GYNECOLOGY
Payer: COMMERCIAL

## 2024-07-26 LAB
ALBUMIN SERPL BCP-MCNC: 4.5 G/DL (ref 3.2–4.9)
ALBUMIN/GLOB SERPL: 1.7 G/DL
ALP SERPL-CCNC: 43 U/L (ref 30–99)
ALT SERPL-CCNC: 12 U/L (ref 2–50)
ANION GAP SERPL CALC-SCNC: 12 MMOL/L (ref 7–16)
AST SERPL-CCNC: 18 U/L (ref 12–45)
BILIRUB SERPL-MCNC: 0.4 MG/DL (ref 0.1–1.5)
BUN SERPL-MCNC: 13 MG/DL (ref 8–22)
CALCIUM ALBUM COR SERPL-MCNC: 8.9 MG/DL (ref 8.5–10.5)
CALCIUM SERPL-MCNC: 9.3 MG/DL (ref 8.5–10.5)
CHLORIDE SERPL-SCNC: 104 MMOL/L (ref 96–112)
CO2 SERPL-SCNC: 21 MMOL/L (ref 20–33)
CREAT SERPL-MCNC: 0.85 MG/DL (ref 0.5–1.4)
CRP SERPL HS-MCNC: 0.5 MG/L (ref 0–3)
DHEA-S SERPL-MCNC: 115 UG/DL (ref 60.9–337)
EST. AVERAGE GLUCOSE BLD GHB EST-MCNC: 100 MG/DL
ESTRADIOL SERPL-MCNC: 144 PG/ML
FSH SERPL-ACNC: 2.7 MIU/ML
GFR SERPLBLD CREATININE-BSD FMLA CKD-EPI: 88 ML/MIN/1.73 M 2
GLOBULIN SER CALC-MCNC: 2.7 G/DL (ref 1.9–3.5)
GLUCOSE SERPL-MCNC: 83 MG/DL (ref 65–99)
HBA1C MFR BLD: 5.1 % (ref 4–5.6)
HCYS SERPL-SCNC: 6.04 UMOL/L
LH SERPL-ACNC: 4.8 IU/L
POTASSIUM SERPL-SCNC: 4.3 MMOL/L (ref 3.6–5.5)
PROGEST SERPL-MCNC: 10.6 NG/ML
PROT SERPL-MCNC: 7.2 G/DL (ref 6–8.2)
SODIUM SERPL-SCNC: 137 MMOL/L (ref 135–145)
T3 SERPL-MCNC: 116 NG/DL (ref 60–181)
T3FREE SERPL-MCNC: 3.36 PG/ML (ref 2–4.4)
T4 FREE SERPL-MCNC: 1.14 NG/DL (ref 0.93–1.7)
T4 SERPL-MCNC: 6.9 UG/DL (ref 4–12)
TSH SERPL-ACNC: 0.69 UIU/ML (ref 0.35–5.5)

## 2024-07-26 PROCEDURE — 80053 COMPREHEN METABOLIC PANEL: CPT

## 2024-07-26 PROCEDURE — 83525 ASSAY OF INSULIN: CPT

## 2024-07-26 PROCEDURE — 84480 ASSAY TRIIODOTHYRONINE (T3): CPT

## 2024-07-26 PROCEDURE — 83002 ASSAY OF GONADOTROPIN (LH): CPT

## 2024-07-26 PROCEDURE — 83001 ASSAY OF GONADOTROPIN (FSH): CPT

## 2024-07-26 PROCEDURE — 36415 COLL VENOUS BLD VENIPUNCTURE: CPT

## 2024-07-26 PROCEDURE — 84402 ASSAY OF FREE TESTOSTERONE: CPT

## 2024-07-26 PROCEDURE — 84481 FREE ASSAY (FT-3): CPT

## 2024-07-26 PROCEDURE — 84144 ASSAY OF PROGESTERONE: CPT

## 2024-07-26 PROCEDURE — 83036 HEMOGLOBIN GLYCOSYLATED A1C: CPT

## 2024-07-26 PROCEDURE — 84270 ASSAY OF SEX HORMONE GLOBUL: CPT

## 2024-07-26 PROCEDURE — 84439 ASSAY OF FREE THYROXINE: CPT

## 2024-07-26 PROCEDURE — 83090 ASSAY OF HOMOCYSTEINE: CPT

## 2024-07-26 PROCEDURE — 82670 ASSAY OF TOTAL ESTRADIOL: CPT

## 2024-07-26 PROCEDURE — 84403 ASSAY OF TOTAL TESTOSTERONE: CPT

## 2024-07-26 PROCEDURE — 86141 C-REACTIVE PROTEIN HS: CPT

## 2024-07-26 PROCEDURE — 84443 ASSAY THYROID STIM HORMONE: CPT

## 2024-07-26 PROCEDURE — 82642 DIHYDROTESTOSTERONE: CPT

## 2024-07-26 PROCEDURE — 82627 DEHYDROEPIANDROSTERONE: CPT

## 2024-07-29 LAB — INSULIN P FAST SERPL-ACNC: 10 UIU/ML (ref 3–25)

## 2024-07-30 LAB — ANDROSTANOLONE SERPL-MCNC: 64.5 PG/ML (ref 24–208)

## 2024-08-01 LAB
SHBG SERPL-SCNC: 34 NMOL/L (ref 25–122)
TESTOST FREE SERPL-MCNC: 3.7 PG/ML (ref 1.1–5.8)
TESTOST SERPL-MCNC: 23 NG/DL (ref 9–55)

## 2024-11-14 ENCOUNTER — APPOINTMENT (RX ONLY)
Dept: URBAN - METROPOLITAN AREA CLINIC 35 | Facility: CLINIC | Age: 44
Setting detail: DERMATOLOGY
End: 2024-11-14

## 2024-11-14 DIAGNOSIS — L81.4 OTHER MELANIN HYPERPIGMENTATION: ICD-10-CM

## 2024-11-14 DIAGNOSIS — Z87.2 PERSONAL HISTORY OF DISEASES OF THE SKIN AND SUBCUTANEOUS TISSUE: ICD-10-CM

## 2024-11-14 DIAGNOSIS — L82.1 OTHER SEBORRHEIC KERATOSIS: ICD-10-CM

## 2024-11-14 DIAGNOSIS — Z71.89 OTHER SPECIFIED COUNSELING: ICD-10-CM

## 2024-11-14 DIAGNOSIS — D18.0 HEMANGIOMA: ICD-10-CM

## 2024-11-14 DIAGNOSIS — D22 MELANOCYTIC NEVI: ICD-10-CM

## 2024-11-14 DIAGNOSIS — H01.13 ECZEMATOUS DERMATITIS OF EYELID: ICD-10-CM

## 2024-11-14 PROBLEM — D22.9 MELANOCYTIC NEVI, UNSPECIFIED: Status: ACTIVE | Noted: 2024-11-14

## 2024-11-14 PROBLEM — D22.39 MELANOCYTIC NEVI OF OTHER PARTS OF FACE: Status: ACTIVE | Noted: 2024-11-14

## 2024-11-14 PROBLEM — H01.139 ECZEMATOUS DERMATITIS OF UNSPECIFIED EYE, UNSPECIFIED EYELID: Status: ACTIVE | Noted: 2024-11-14

## 2024-11-14 PROBLEM — D18.01 HEMANGIOMA OF SKIN AND SUBCUTANEOUS TISSUE: Status: ACTIVE | Noted: 2024-11-14

## 2024-11-14 PROBLEM — D22.5 MELANOCYTIC NEVI OF TRUNK: Status: ACTIVE | Noted: 2024-11-14

## 2024-11-14 PROCEDURE — 99213 OFFICE O/P EST LOW 20 MIN: CPT

## 2024-11-14 PROCEDURE — ? DEFER

## 2024-11-14 PROCEDURE — ? PRESCRIPTION

## 2024-11-14 PROCEDURE — ? ADDITIONAL NOTES

## 2024-11-14 PROCEDURE — ? TREATMENT REGIMEN

## 2024-11-14 PROCEDURE — ? COSMETIC SHAVE REMOVAL

## 2024-11-14 PROCEDURE — ? COUNSELING

## 2024-11-14 RX ORDER — CETIRIZINE HYDROCHLORIDE 10 MG/1
1 TABLET, FILM COATED ORAL BID
Qty: 180 | Refills: 2 | Status: ERX

## 2024-11-14 RX ORDER — HYDROCORTISONE 25 MG/G
THIN LAYER OINTMENT TOPICAL BID
Qty: 28.35 | Refills: 2 | Status: ERX | COMMUNITY
Start: 2024-11-14

## 2024-11-14 RX ADMIN — HYDROCORTISONE THIN LAYER: 25 OINTMENT TOPICAL at 00:00

## 2024-11-14 ASSESSMENT — LOCATION SIMPLE DESCRIPTION DERM
LOCATION SIMPLE: RIGHT EYEBROW
LOCATION SIMPLE: RIGHT POSTERIOR THIGH
LOCATION SIMPLE: SCALP
LOCATION SIMPLE: RIGHT BACK
LOCATION SIMPLE: LEFT EYEBROW
LOCATION SIMPLE: ABDOMEN
LOCATION SIMPLE: RIGHT SHOULDER
LOCATION SIMPLE: SUBMENTAL CHIN

## 2024-11-14 ASSESSMENT — LOCATION ZONE DERM
LOCATION ZONE: FACE
LOCATION ZONE: TRUNK
LOCATION ZONE: ARM
LOCATION ZONE: SCALP
LOCATION ZONE: LEG

## 2024-11-14 ASSESSMENT — LOCATION DETAILED DESCRIPTION DERM
LOCATION DETAILED: LEFT LATERAL EYEBROW
LOCATION DETAILED: RIGHT CENTRAL PARIETAL SCALP
LOCATION DETAILED: RIGHT POSTERIOR SHOULDER
LOCATION DETAILED: RIGHT PROXIMAL LATERAL POSTERIOR THIGH
LOCATION DETAILED: RIGHT LATERAL ABDOMEN
LOCATION DETAILED: SUBMENTAL CHIN
LOCATION DETAILED: RIGHT LATERAL EYEBROW
LOCATION DETAILED: LEFT SUPERIOR FLANK
LOCATION DETAILED: RIGHT SUPERIOR LATERAL UPPER BACK
LOCATION DETAILED: LEFT RIB CAGE

## 2024-11-14 NOTE — PROCEDURE: DEFER
Procedure To Be Performed At Next Visit: Shave Removal (Cosmetic)
Detail Level: Simple
Size Of Lesion In Cm (Optional): 0
Introduction Text (Please End With A Colon): The following procedure was deferred:
X Size Of Lesion In Cm (Optional): 0.4

## 2024-11-14 NOTE — PROCEDURE: COSMETIC SHAVE REMOVAL
Detail Level: Detailed
Size Of Lesion In Cm (Required): 0.4
X Size Of Lesion In Cm (Optional): 0
Biopsy Method: Dermablade
Anesthesia Type: 1% lidocaine with epinephrine
Hemostasis: Drysol
Wound Care: Petrolatum
Lab: [id number]
Lab Facility: 
Path Notes (To The Dermatopathologist): Please check margins.
Render Path Notes In Note?: No
Medical Necessity Clause: This procedure was medically necessary because the lesion that was treated was:
Consent was obtained from the patient. The risks and benefits to therapy were discussed in detail. Specifically, the risks of infection, scarring, bleeding, prolonged wound healing, incomplete removal, allergy to anesthesia, nerve injury and recurrence were addressed. Prior to the procedure, the treatment site was clearly identified and confirmed by the patient. All components of Universal Protocol/PAUSE Rule completed.
Post-Care Instructions: I reviewed with the patient in detail post-care instructions. Patient is to keep the biopsy site dry overnight, and then apply bacitracin twice daily until healed. Patient may apply hydrogen peroxide soaks to remove any crusting.
Notification Instructions: Patient will be notified of biopsy results. However, patient instructed to call the office if not contacted within 2 weeks.
Billing Type: Third-Party Bill

## 2024-11-14 NOTE — PROCEDURE: ADDITIONAL NOTES
Render Risk Assessment In Note?: no
Detail Level: Simple
Additional Notes: This procedure was not done today.  It was deferred.

## 2024-11-14 NOTE — PROCEDURE: TREATMENT REGIMEN
Detail Level: Zone
Continue Regimen: Hydrocortisone 2.5% ointment BID 2 weeks on/off\\nZyrtec 10 mg. BID ( This is for her chronic urticaria and allergic rhinitis)  The chronic urticaria has been under excellent control.

## 2025-01-10 ENCOUNTER — APPOINTMENT (OUTPATIENT)
Dept: RADIOLOGY | Facility: MEDICAL CENTER | Age: 45
End: 2025-01-10
Attending: OBSTETRICS & GYNECOLOGY
Payer: COMMERCIAL

## 2025-01-10 DIAGNOSIS — R53.83 OTHER FATIGUE: ICD-10-CM

## 2025-01-10 DIAGNOSIS — E03.9 PRIMARY HYPOTHYROIDISM: ICD-10-CM

## 2025-01-10 DIAGNOSIS — R05.9 COUGH IN ADULT: ICD-10-CM

## 2025-01-10 DIAGNOSIS — N95.9 UNSPECIFIED MENOPAUSAL AND PERIMENOPAUSAL DISORDER: ICD-10-CM

## 2025-01-10 DIAGNOSIS — E55.9 VITAMIN D DEFICIENCY DISEASE: ICD-10-CM

## 2025-01-10 DIAGNOSIS — J06.9 ACUTE UPPER RESPIRATORY INFECTION: ICD-10-CM

## 2025-01-10 DIAGNOSIS — E66.9 ADULT-ONSET OBESITY: ICD-10-CM

## 2025-01-10 DIAGNOSIS — R54 AGE-RELATED PHYSICAL DEBILITY: ICD-10-CM

## 2025-01-10 DIAGNOSIS — N92.2 EXCESSIVE MENSTRUATION AT PUBERTY: ICD-10-CM

## 2025-01-10 DIAGNOSIS — E88.810 METABOLIC SYNDROME: ICD-10-CM

## 2025-01-10 PROCEDURE — 71046 X-RAY EXAM CHEST 2 VIEWS: CPT

## 2025-01-15 ENCOUNTER — HOSPITAL ENCOUNTER (OUTPATIENT)
Facility: MEDICAL CENTER | Age: 45
End: 2025-01-15
Attending: OBSTETRICS & GYNECOLOGY
Payer: COMMERCIAL

## 2025-01-15 ENCOUNTER — OFFICE VISIT (OUTPATIENT)
Dept: OBGYN | Facility: CLINIC | Age: 45
End: 2025-01-15
Payer: COMMERCIAL

## 2025-01-15 VITALS — DIASTOLIC BLOOD PRESSURE: 82 MMHG | WEIGHT: 159 LBS | BODY MASS INDEX: 28.17 KG/M2 | SYSTOLIC BLOOD PRESSURE: 128 MMHG

## 2025-01-15 DIAGNOSIS — Z12.4 SCREENING FOR CERVICAL CANCER: ICD-10-CM

## 2025-01-15 DIAGNOSIS — Z01.419 WELL WOMAN EXAM WITH ROUTINE GYNECOLOGICAL EXAM: ICD-10-CM

## 2025-01-15 DIAGNOSIS — Z30.09 COUNSELING FOR BIRTH CONTROL REGARDING INTRAUTERINE DEVICE (IUD): ICD-10-CM

## 2025-01-15 DIAGNOSIS — Z12.39 ENCOUNTER FOR BREAST CANCER SCREENING USING NON-MAMMOGRAM MODALITY: ICD-10-CM

## 2025-01-15 PROCEDURE — 3074F SYST BP LT 130 MM HG: CPT | Performed by: OBSTETRICS & GYNECOLOGY

## 2025-01-15 PROCEDURE — 87591 N.GONORRHOEAE DNA AMP PROB: CPT

## 2025-01-15 PROCEDURE — 87624 HPV HI-RISK TYP POOLED RSLT: CPT

## 2025-01-15 PROCEDURE — 99396 PREV VISIT EST AGE 40-64: CPT | Performed by: OBSTETRICS & GYNECOLOGY

## 2025-01-15 PROCEDURE — 87491 CHLMYD TRACH DNA AMP PROBE: CPT

## 2025-01-15 PROCEDURE — 88142 CYTOPATH C/V THIN LAYER: CPT

## 2025-01-15 PROCEDURE — 3079F DIAST BP 80-89 MM HG: CPT | Performed by: OBSTETRICS & GYNECOLOGY

## 2025-01-15 RX ORDER — METFORMIN HYDROCHLORIDE 500 MG/1
500 TABLET, EXTENDED RELEASE ORAL 2 TIMES DAILY
COMMUNITY
Start: 2025-01-02

## 2025-01-15 RX ORDER — THYROID 30 MG/1
30 TABLET ORAL DAILY
COMMUNITY
Start: 2025-01-06

## 2025-01-15 ASSESSMENT — FIBROSIS 4 INDEX: FIB4 SCORE: 0.72

## 2025-01-15 NOTE — PROGRESS NOTES
Theresa Caldwell is a 44 y.o.  female who presents for her Annual Gynecologic Exam      HPI Comments: Pt presents for her annual well woman exam.   She is a previous patient of mine from [facility] office. She is withour complaints at this time. Her last exam was in  and last pap in . She is seeing Shelly Harper and is using both testosterone cream and vaginal estrogen for dyspareunia. She is still having regular monthly menses. She reports after Covid vaccine she had very heavy and clots with her menses for several years and has finally returned to her normal. She requests discussion on contraception. She is very nervous about hormones as she believes her significant onset of Hives was hormonally related (although this belief stems from the fact that her son was 4 months when it started) - she had a years long ac with severe hives and finally was treated with immunosuppressants that seems to have the stopped cycle. She saw multiple allergists and dermatologists thru those years and continues to follow with dermatology.   Patient's last menstrual period was 2024 (exact date).    Mammogram 4 years  Dexa scan NI  Colonoscopy NI    Review of Systems:   Pertinent positives documented in HPI and all other systems reviewed & are negative    All PMH, PSH, allergies, social history and FH reviewed and updated today:    PGYN Hx:   Cycles every 28-30, bleeding for 3-5d.   STD hx: remote ()   Pap smear hx: last in , had a LEEP in , normal after    OB History    Para Term  AB Living   2 1 1   1 1   SAB IAB Ectopic Molar Multiple Live Births   1                # Outcome Date GA Lbr Torsten/2nd Weight Sex Type Anes PTL Lv   2 SAB            1 Term    9 lb 1 oz M Vag-Spont        Past Medical History:   Diagnosis Date    Other specified disorder of intestines     constipation/ irregular    Urticaria, chronic      Past Surgical History:   Procedure Laterality Date    VENTRAL HERNIA REPAIR   06/16/2011    Performed by CARIN LEIVA at [facility]    EYE SURGERY  01/01/2009    lasik bilgogo    CERVICAL CONIZATION      DENTAL EXTRACTION(S)      wisdom teeth    OTHER ABDOMINAL SURGERY      Abdominalplasty    TONSILLECTOMY AND ADENOIDECTOMY      as a child     Medications:   Current Outpatient Medications Ordered in Epic   Medication Sig Dispense Refill    metFORMIN ER (GLUCOPHAGE XR) 500 MG TABLET SR 24 HR Take 500 mg by mouth 2 times a day.      ARMOUR THYROID 30 MG Tab Take 30 mg by mouth every day.      cetirizine (ZYRTEC) 10 MG TABS Take 10 mg by mouth every day.      oxyCODONE-acetaminophen (PERCOCET) 5-325 MG Tab TAKE 1 TABLET BY MOUTH EVERY 4 HOURS AS NEEDED FOR PAIN FOR 5 DAYS (Patient not taking: Reported on 1/15/2025)  0    doxycycline (VIBRAMYCIN) 100 MG Tab Take 1 Tab by mouth 2 times a day. (Patient not taking: Reported on 12/31/2019) 14 Tab 0    azithromycin (ZITHROMAX) 250 MG Tab Take 500 mg (two tablets) on day one and then take 250 mg (1 tablet) for 4 days. (Patient not taking: Reported on 12/31/2019) 6 Tab 0    guaifenesin-codeine (CHERATUSSIN AC) Solution oral solution Take 5 mL by mouth every 6 hours as needed. (Patient not taking: Reported on 12/31/2019) 120 mL 0    Hydrocod Polst-CPM Polst ER (TUSSIONEX) 10-8 MG/5ML Suspension Extended Release Take 5 mL by mouth every 12 hours. (Patient not taking: Reported on 12/31/2019) 60 mL 0    hydrocod polst-chlorphen polst (TUSSIONEX) 10-8 MG/5ML LQCR Take 5 mL by mouth every 12 hours as needed for Cough or Rhinitis. (Patient not taking: Reported on 12/31/2019) 140 mL 0    fluticasone (FLONASE) 50 MCG/ACT nasal spray Spray 2 Sprays in nose every day. Each Nostril (Patient not taking: Reported on 12/31/2019) 1 Bottle 0    ranitidine (ZANTAC) 150 MG TABS Take 150 mg by mouth 2 times a day. (Patient not taking: Reported on 1/15/2025)      levonorgestrel-ethinyl estradiol (GINA-28) 0.15-30 MG-MCG per tablet Take 1 Tab by mouth every  day. (Patient not taking: Reported on 1/15/2025)       No current [facility]-ordered facility-administered medications on file.     Patient has no known allergies.  Social History     Socioeconomic History    Marital status: Single   Tobacco Use    Smoking status: Never    Smokeless tobacco: Never   Substance and Sexual Activity    Alcohol use: Yes     Comment: rare    Drug use: No    Sexual activity: Yes     Partners: Male     Family History   Problem Relation Age of Onset    Lung Disease Maternal Grandmother     Heart Disease Paternal Grandfather           Objective:   Vital measurements:  /82 (BP Location: Right arm, Patient Position: Sitting)   Wt 159 lb   LMP 12/29/2024 (Exact Date)   BMI 28.17 kg/m²   Body mass index is 28.17 kg/m². (Goal BM I>18 <25)    Physical Exam   Nursing note and vitals reviewed.  Constitutional: She is oriented to person, place, and time. She appears well-developed and well-nourished. No distress.     HEENT:   Head: Normocephalic and atraumatic.     Neck: Neck supple. No thyromegaly present. No cervical or supraclavicular lymphadenopathy.    Pulmonary/Chest: Effort normal and breath sounds normal. No respiratory distress.   Cardiovascular: Regular, rate and rhythm. No edema.    Breast: Symmetrical, normal consistency without palpable masses., No dimpling or skin changes, Normal nipples without discharge, no axillary lymphadenopathy    Abdominal: Soft. Bowel sounds are normal. She exhibits no distension and no mass. No tenderness. She has no rebound and no guarding.     Genitourinary:  Pelvic exam was performed with patient supine.  External genitalia with no abnormal pigmentation, labial fusion, rash, tenderness, lesion or injury to the labia bilaterally.  Vagina is  with no lesions, foul discharge, erythema, tenderness or bleeding. No foreign body around the vagina or signs of injury.   Cervix exhibits no motion tenderness, no discharge and is friable with contact bleeding present  no lesions.   Uterus is  not deviated, not enlarged, not fixed and not tender.  Right adnexa displays no mass, no tenderness and no fullness.  Left adnexa displays no mass, no tenderness and no fullness.     Musculoskeletal: Normal range of motion. Non tender. She exhibits no edema and no tenderness.     Lymphadenopathy: She has no cervical or supraclavicular adenopathy.     Neurological: She is alert and oriented to person, place, and time. She exhibits normal muscle tone.     Skin: Skin is warm and dry. No rash noted. She is not diaphoretic. No erythema. No pallor.     Psychiatric: She has a normal mood and affect. Her behavior is normal. Judgment and thought content normal.        Assessment:     1. Well woman exam with routine gynecological exam  THINPREP PAP W/HPV AND CTNG    MA-SCREENING MAMMO BILAT W/TOMOSYNTHESIS W/CAD      2. Screening for cervical cancer  THINPREP PAP W/HPV AND CTNG      3. Encounter for breast cancer screening using non-mammogram modality  MA-SCREENING MAMMO BILAT W/TOMOSYNTHESIS W/CAD      4. Counseling for birth control regarding intrauterine device (IUD)              Plan:     #Well Woman  - Pap and physical exam performed. Discuss pap smear screening guidelines.   - Self breast awareness discussed.  - Counseling: breast self exam, mammography screening, menopause, osteoporosis, and adequate intake of calcium and vitamin D  - Encouraged exercise and proper diet.  - Mammograms annually. Order placed.  - See medications and orders placed in encounter report.  - Weight bearing exercise daily to strengthen bones  - Calcium 1200mg daily and Vitamin D 800 IU daily  - Discussed options for contraception and she desires trial of Mirena IUD as she does not want paragard secondary to potential heavy bleeding and feels comfortable with small systemic absorption of progesterone with this device. She will schedule placement.

## 2025-01-15 NOTE — PROGRESS NOTES
Pt presents for GYN visit-Annual exam and discuss BC options.   Pt doing well with fairly normal menses.Considering Paraguard?   Last pap 2022 @ [facility]  Ph# [phone]

## 2025-01-16 LAB
C TRACH DNA GENITAL QL NAA+PROBE: NEGATIVE
N GONORRHOEA DNA GENITAL QL NAA+PROBE: NEGATIVE
SPECIMEN SOURCE: NORMAL

## 2025-01-17 ENCOUNTER — TELEPHONE (OUTPATIENT)
Dept: HEALTH INFORMATION MANAGEMENT | Facility: OTHER | Age: 45
End: 2025-01-17
Payer: COMMERCIAL

## 2025-01-21 ENCOUNTER — HOSPITAL ENCOUNTER (OUTPATIENT)
Dept: LAB | Facility: MEDICAL CENTER | Age: 45
End: 2025-01-21
Attending: OBSTETRICS & GYNECOLOGY
Payer: COMMERCIAL

## 2025-01-21 LAB
25(OH)D3 SERPL-MCNC: 52 NG/ML (ref 30–100)
ALBUMIN SERPL BCP-MCNC: 5.2 G/DL (ref 3.2–4.9)
ALBUMIN/GLOB SERPL: 1.8 G/DL
ALP SERPL-CCNC: 48 U/L (ref 30–99)
ALT SERPL-CCNC: 19 U/L (ref 2–50)
ANION GAP SERPL CALC-SCNC: 14 MMOL/L (ref 7–16)
AST SERPL-CCNC: 23 U/L (ref 12–45)
BILIRUB SERPL-MCNC: 0.6 MG/DL (ref 0.1–1.5)
BUN SERPL-MCNC: 13 MG/DL (ref 8–22)
CALCIUM ALBUM COR SERPL-MCNC: 8.4 MG/DL (ref 8.5–10.5)
CALCIUM SERPL-MCNC: 9.4 MG/DL (ref 8.5–10.5)
CHLORIDE SERPL-SCNC: 102 MMOL/L (ref 96–112)
CHOLEST SERPL-MCNC: 165 MG/DL (ref 100–199)
CO2 SERPL-SCNC: 23 MMOL/L (ref 20–33)
CREAT SERPL-MCNC: 0.88 MG/DL (ref 0.5–1.4)
EST. AVERAGE GLUCOSE BLD GHB EST-MCNC: 94 MG/DL
ESTRADIOL SERPL-MCNC: 56.4 PG/ML
FASTING STATUS PATIENT QL REPORTED: NORMAL
FSH SERPL-ACNC: 4.7 MIU/ML
GFR SERPLBLD CREATININE-BSD FMLA CKD-EPI: 84 ML/MIN/1.73 M 2
GLOBULIN SER CALC-MCNC: 2.9 G/DL (ref 1.9–3.5)
GLUCOSE SERPL-MCNC: 80 MG/DL (ref 65–99)
HBA1C MFR BLD: 4.9 % (ref 4–5.6)
HCYS SERPL-SCNC: 10.96 UMOL/L
HDLC SERPL-MCNC: 72 MG/DL
LDLC SERPL CALC-MCNC: 79 MG/DL
LH SERPL-ACNC: 8.1 IU/L
POTASSIUM SERPL-SCNC: 4 MMOL/L (ref 3.6–5.5)
PROGEST SERPL-MCNC: 3.25 NG/ML
PROT SERPL-MCNC: 8.1 G/DL (ref 6–8.2)
SODIUM SERPL-SCNC: 139 MMOL/L (ref 135–145)
T3 SERPL-MCNC: 113 NG/DL (ref 60–181)
T3FREE SERPL-MCNC: 3.4 PG/ML (ref 2–4.4)
T4 FREE SERPL-MCNC: 1.28 NG/DL (ref 0.93–1.7)
T4 SERPL-MCNC: 7.7 UG/DL (ref 4–12)
TRIGL SERPL-MCNC: 68 MG/DL (ref 0–149)
TSH SERPL-ACNC: 0.46 UIU/ML (ref 0.35–5.5)

## 2025-01-21 PROCEDURE — 83525 ASSAY OF INSULIN: CPT

## 2025-01-21 PROCEDURE — 36415 COLL VENOUS BLD VENIPUNCTURE: CPT

## 2025-01-21 PROCEDURE — 84270 ASSAY OF SEX HORMONE GLOBUL: CPT

## 2025-01-21 PROCEDURE — 82670 ASSAY OF TOTAL ESTRADIOL: CPT

## 2025-01-21 PROCEDURE — 84439 ASSAY OF FREE THYROXINE: CPT

## 2025-01-21 PROCEDURE — 80053 COMPREHEN METABOLIC PANEL: CPT

## 2025-01-21 PROCEDURE — 80061 LIPID PANEL: CPT

## 2025-01-21 PROCEDURE — 84144 ASSAY OF PROGESTERONE: CPT

## 2025-01-21 PROCEDURE — 84481 FREE ASSAY (FT-3): CPT

## 2025-01-21 PROCEDURE — 84402 ASSAY OF FREE TESTOSTERONE: CPT

## 2025-01-21 PROCEDURE — 83090 ASSAY OF HOMOCYSTEINE: CPT

## 2025-01-21 PROCEDURE — 83002 ASSAY OF GONADOTROPIN (LH): CPT

## 2025-01-21 PROCEDURE — 84403 ASSAY OF TOTAL TESTOSTERONE: CPT

## 2025-01-21 PROCEDURE — 84480 ASSAY TRIIODOTHYRONINE (T3): CPT

## 2025-01-21 PROCEDURE — 84443 ASSAY THYROID STIM HORMONE: CPT

## 2025-01-21 PROCEDURE — 83001 ASSAY OF GONADOTROPIN (FSH): CPT

## 2025-01-21 PROCEDURE — 82306 VITAMIN D 25 HYDROXY: CPT

## 2025-01-21 PROCEDURE — 83036 HEMOGLOBIN GLYCOSYLATED A1C: CPT

## 2025-01-22 ENCOUNTER — HOSPITAL ENCOUNTER (OUTPATIENT)
Dept: RADIOLOGY | Facility: MEDICAL CENTER | Age: 45
End: 2025-01-22
Attending: OBSTETRICS & GYNECOLOGY
Payer: COMMERCIAL

## 2025-01-22 DIAGNOSIS — Z01.419 WELL WOMAN EXAM WITH ROUTINE GYNECOLOGICAL EXAM: ICD-10-CM

## 2025-01-22 DIAGNOSIS — Z12.39 ENCOUNTER FOR BREAST CANCER SCREENING USING NON-MAMMOGRAM MODALITY: ICD-10-CM

## 2025-01-22 LAB
HPV I/H RISK 1 DNA SPEC QL NAA+PROBE: NOT DETECTED
SPECIMEN SOURCE: NORMAL
THINPREP PAP, CYTOLOGY NL11781: NORMAL

## 2025-01-22 PROCEDURE — 77067 SCR MAMMO BI INCL CAD: CPT

## 2025-01-23 LAB — INSULIN P FAST SERPL-ACNC: 10 UIU/ML (ref 3–25)

## 2025-01-25 LAB
SHBG SERPL-SCNC: 42 NMOL/L (ref 25–122)
TESTOST FREE SERPL-MCNC: 5.4 PG/ML (ref 1.1–5.8)
TESTOST SERPL-MCNC: 37 NG/DL (ref 9–55)

## 2025-02-07 ENCOUNTER — HOSPITAL ENCOUNTER (OUTPATIENT)
Facility: MEDICAL CENTER | Age: 45
End: 2025-02-07
Attending: PHYSICIAN ASSISTANT
Payer: COMMERCIAL

## 2025-02-07 ENCOUNTER — OFFICE VISIT (OUTPATIENT)
Dept: URGENT CARE | Facility: PHYSICIAN GROUP | Age: 45
End: 2025-02-07
Payer: COMMERCIAL

## 2025-02-07 VITALS
OXYGEN SATURATION: 94 % | TEMPERATURE: 99.6 F | RESPIRATION RATE: 16 BRPM | BODY MASS INDEX: 28.21 KG/M2 | HEART RATE: 85 BPM | HEIGHT: 63 IN | WEIGHT: 159.2 LBS | DIASTOLIC BLOOD PRESSURE: 88 MMHG | SYSTOLIC BLOOD PRESSURE: 108 MMHG

## 2025-02-07 DIAGNOSIS — R05.8 POST-VIRAL COUGH SYNDROME: ICD-10-CM

## 2025-02-07 DIAGNOSIS — Z11.3 SCREEN FOR STD (SEXUALLY TRANSMITTED DISEASE): ICD-10-CM

## 2025-02-07 LAB
AMBIGUOUS DTTM AMBI4: NORMAL
AMBIGUOUS DTTM AMBI4: NORMAL

## 2025-02-07 PROCEDURE — 87510 GARDNER VAG DNA DIR PROBE: CPT

## 2025-02-07 PROCEDURE — 87491 CHLMYD TRACH DNA AMP PROBE: CPT

## 2025-02-07 PROCEDURE — 87591 N.GONORRHOEAE DNA AMP PROB: CPT

## 2025-02-07 PROCEDURE — 3074F SYST BP LT 130 MM HG: CPT | Performed by: PHYSICIAN ASSISTANT

## 2025-02-07 PROCEDURE — 87660 TRICHOMONAS VAGIN DIR PROBE: CPT

## 2025-02-07 PROCEDURE — 3079F DIAST BP 80-89 MM HG: CPT | Performed by: PHYSICIAN ASSISTANT

## 2025-02-07 PROCEDURE — 87480 CANDIDA DNA DIR PROBE: CPT

## 2025-02-07 PROCEDURE — 99214 OFFICE O/P EST MOD 30 MIN: CPT | Performed by: PHYSICIAN ASSISTANT

## 2025-02-07 RX ORDER — DEXTROMETHORPHAN HYDROBROMIDE AND PROMETHAZINE HYDROCHLORIDE 15; 6.25 MG/5ML; MG/5ML
5 SYRUP ORAL EVERY 4 HOURS PRN
Qty: 120 ML | Refills: 0 | Status: SHIPPED | OUTPATIENT
Start: 2025-02-07 | End: 2025-02-07

## 2025-02-07 RX ORDER — DEXTROMETHORPHAN HYDROBROMIDE AND PROMETHAZINE HYDROCHLORIDE 15; 6.25 MG/5ML; MG/5ML
5 SYRUP ORAL EVERY 4 HOURS PRN
Qty: 120 ML | Refills: 0 | Status: SHIPPED | OUTPATIENT
Start: 2025-02-07

## 2025-02-07 ASSESSMENT — ENCOUNTER SYMPTOMS
WHEEZING: 0
MYALGIAS: 0
SORE THROAT: 0
NAUSEA: 0
COUGH: 1
PALPITATIONS: 0
DIAPHORESIS: 0
SHORTNESS OF BREATH: 0
DIARRHEA: 0
CHILLS: 0
ABDOMINAL PAIN: 0
FEVER: 0
DIZZINESS: 0
SINUS PAIN: 0
HEADACHES: 0
SPUTUM PRODUCTION: 0
FLANK PAIN: 0
VOMITING: 0

## 2025-02-07 ASSESSMENT — FIBROSIS 4 INDEX: FIB4 SCORE: 0.73

## 2025-02-07 NOTE — PROGRESS NOTES
Subjective:     CHIEF COMPLAINT  Chief Complaint   Patient presents with    Cough     Lower Ab px x11d,  req STD test       HPI  Theresa Caldwell is a very pleasant 44 y.o. female who presents to the clinic with cough that has been present for the last 10/11 days.  Symptoms originally started with sore throat, fatigue, cough, congestion and mild bodyaches.  A few days later she was started on a course of azithromycin which she took for 5 days until completion.  States noted minimal improvement while on the medication.  At this point she is no longer experiencing any bodyaches or chills.  Has not been running a fever.  Continues to have a cough that is mildly productive.  No shortness of breath or chest pain.  Over the last 4 days she was seen by an at-home nurse in which she received IV fluids as well as an IV steroid medication.  Continues to have a lingering cough that is also causing soreness to her lower abdomen with coughing fits.  Has had prior history of lower abdominal hernia that she would like to rule out.  She has been having normal bowel movements.  Denies any dysuria.  Recently sexually active with new partner and would like to have STD screening performed.  No vaginal discharge, rash or lesion.    REVIEW OF SYSTEMS  Review of Systems   Constitutional:  Negative for chills, diaphoresis, fever and malaise/fatigue.   HENT:  Positive for congestion. Negative for ear pain, sinus pain and sore throat.    Respiratory:  Positive for cough. Negative for sputum production, shortness of breath and wheezing.    Cardiovascular:  Negative for chest pain and palpitations.   Gastrointestinal:  Negative for abdominal pain, diarrhea, nausea and vomiting.        Lower abdominal soreness with coughing fits   Genitourinary:  Negative for dysuria, flank pain, frequency, hematuria and urgency.   Musculoskeletal:  Negative for myalgias.   Neurological:  Negative for dizziness and headaches.   Endo/Heme/Allergies:  Negative  for environmental allergies.       PAST MEDICAL HISTORY  There are no active problems to display for this patient.      SURGICAL HISTORY   has a past surgical history that includes tonsillectomy and adenoidectomy; cervical conization; eye surgery (01/01/2009); dental extraction(s); ventral hernia repair (06/16/2011); and other abdominal surgery.    ALLERGIES  No Known Allergies    CURRENT MEDICATIONS  Home Medications       Reviewed by Quintin Ledbetter P.A.-C. (Physician Assistant) on 02/07/25 at 1439  Med List Status: <None>     Medication Last Dose Status   ARMOUR THYROID 30 MG Tab Taking Active   azithromycin (ZITHROMAX) 250 MG Tab Not Taking Active   cetirizine (ZYRTEC) 10 MG TABS Taking Active   doxycycline (VIBRAMYCIN) 100 MG Tab Not Taking Active   fluticasone (FLONASE) 50 MCG/ACT nasal spray Not Taking Active   guaifenesin-codeine (CHERATUSSIN AC) Solution oral solution Not Taking Active   hydrocod polst-chlorphen polst (TUSSIONEX) 10-8 MG/5ML LQCR Not Taking Active   Hydrocod Polst-CPM Polst ER (TUSSIONEX) 10-8 MG/5ML Suspension Extended Release Not Taking Active   levonorgestrel-ethinyl estradiol (GINA-28) 0.15-30 MG-MCG per tablet  Active   metFORMIN ER (GLUCOPHAGE XR) 500 MG TABLET SR 24 HR Taking Active   oxyCODONE-acetaminophen (PERCOCET) 5-325 MG Tab Not Taking Active   ranitidine (ZANTAC) 150 MG TABS Not Taking Active                    SOCIAL HISTORY  Social History     Tobacco Use    Smoking status: Never    Smokeless tobacco: Never   Vaping Use    Vaping status: Never Used   Substance and Sexual Activity    Alcohol use: Yes     Comment: rare    Drug use: No    Sexual activity: Yes     Partners: Male       FAMILY HISTORY  Family History   Problem Relation Age of Onset    Lung Disease Maternal Grandmother     Heart Disease Paternal Grandfather           Objective:     VITAL SIGNS: /88 (BP Location: Right arm, Patient Position: Sitting, BP Cuff Size: Adult)   Pulse 85   Temp 37.6 °C (99.6 °F)  "(Temporal)   Resp 16   Ht 1.6 m (5' 3\")   Wt 72.2 kg (159 lb 3.2 oz)   LMP 12/29/2024 (Exact Date)   SpO2 94%   BMI 28.20 kg/m²     PHYSICAL EXAM  Physical Exam  Constitutional:       General: She is not in acute distress.     Appearance: Normal appearance. She is not ill-appearing, toxic-appearing or diaphoretic.   HENT:      Head: Normocephalic and atraumatic.      Right Ear: Tympanic membrane, ear canal and external ear normal.      Left Ear: Tympanic membrane, ear canal and external ear normal.      Nose: Nose normal. No congestion or rhinorrhea.      Mouth/Throat:      Mouth: Mucous membranes are moist.      Pharynx: No oropharyngeal exudate or posterior oropharyngeal erythema.   Eyes:      Conjunctiva/sclera: Conjunctivae normal.   Cardiovascular:      Rate and Rhythm: Normal rate and regular rhythm.      Pulses: Normal pulses.      Heart sounds: Normal heart sounds.   Pulmonary:      Effort: Pulmonary effort is normal.      Breath sounds: Normal breath sounds. No wheezing, rhonchi or rales.   Abdominal:      Palpations: Abdomen is soft.      Tenderness: There is no abdominal tenderness. There is no guarding or rebound.      Comments: No palpable hernia appreciated.  Lower abdomen nontender.  No rebound, rigidity or guarding.   Musculoskeletal:      Cervical back: Normal range of motion. No muscular tenderness.   Lymphadenopathy:      Cervical: No cervical adenopathy.   Skin:     General: Skin is warm and dry.      Capillary Refill: Capillary refill takes less than 2 seconds.   Neurological:      Mental Status: She is alert.   Psychiatric:         Mood and Affect: Mood normal.         Thought Content: Thought content normal.         Assessment/Plan:     1. Post-viral cough syndrome  - promethazine-dextromethorphan (PROMETHAZINE-DM) 6.25-15 MG/5ML syrup; Take 5 mL by mouth every four hours as needed for Cough.  Dispense: 120 mL; Refill: 0    2. Screen for STD (sexually transmitted disease)  - VAGINAL " PATHOGENS DNA PANEL; Future  - Chlamydia/GC, PCR (Genital/Anal swab); Future      MDM/Comments:    Very pleasant and well-appearing 44-year-old female present to the clinic with a cough that has been present for the last 10-11 days.  On exam lungs are clear to auscultation.  No wheezes, rhonchi or rales.  SpO2 94% on room air.  Previously completed a course of azithromycin as well as IV steroids which provided slight improvement.  At this time I have low suspicion for any secondary bacterial infection.  Offered performing chest x-ray in clinic and patient respectfully declined at this time which I believe is reasonable given lung sounds are clear.  We will continue with supportive OTC cough medications and increase fluids as discussed.  Patient had a benign abdominal exam without any signs of hernia.  Abdomen nontender.  Will send out for GC chlamydia testing as well as vaginal pathogen screening and I will follow-up once available.    Differential diagnosis, natural history, supportive care, and indications for immediate follow-up discussed. All questions answered. Patient agrees with the plan of care.    Follow-up as needed if symptoms worsen or fail to improve to PCP, Urgent care or Emergency Room.    I have personally reviewed prior external notes and test results pertinent to today's visit.  I have independently reviewed and interpreted all diagnostics ordered during this urgent care acute visit.   Discussed management options (risks,benefits, and alternatives to treatment). Pt expresses understanding and the treatment plan was agreed upon. Questions were encouraged and answered to pt's satisfaction.    Please note that this dictation was created using voice recognition software. I have made a reasonable attempt to correct obvious errors, but I expect that there are errors of grammar and possibly content that I did not discover before finalizing the note.

## 2025-02-08 LAB
C TRACH DNA GENITAL QL NAA+PROBE: NEGATIVE
CANDIDA DNA VAG QL PROBE+SIG AMP: NEGATIVE
G VAGINALIS DNA VAG QL PROBE+SIG AMP: NEGATIVE
N GONORRHOEA DNA GENITAL QL NAA+PROBE: NEGATIVE
SPECIMEN SOURCE: NORMAL
T VAGINALIS DNA VAG QL PROBE+SIG AMP: NEGATIVE

## 2025-02-10 ENCOUNTER — APPOINTMENT (OUTPATIENT)
Dept: URBAN - METROPOLITAN AREA CLINIC 35 | Facility: CLINIC | Age: 45
Setting detail: DERMATOLOGY
End: 2025-02-10

## 2025-02-10 DIAGNOSIS — D22 MELANOCYTIC NEVI: ICD-10-CM

## 2025-02-10 PROBLEM — D22.39 MELANOCYTIC NEVI OF OTHER PARTS OF FACE: Status: ACTIVE | Noted: 2025-02-10

## 2025-02-10 PROCEDURE — ? COSMETIC SHAVE REMOVAL

## 2025-02-10 ASSESSMENT — LOCATION ZONE DERM: LOCATION ZONE: FACE

## 2025-02-10 ASSESSMENT — LOCATION DETAILED DESCRIPTION DERM: LOCATION DETAILED: SUBMENTAL CHIN

## 2025-02-10 ASSESSMENT — LOCATION SIMPLE DESCRIPTION DERM: LOCATION SIMPLE: SUBMENTAL CHIN

## 2025-02-10 NOTE — PROCEDURE: COSMETIC SHAVE REMOVAL
Detail Level: Detailed
Price (Use Numbers Only, No Special Characters Or $): 221
Size Of Lesion In Cm (Required): 0.4
X Size Of Lesion In Cm (Optional): 0
Biopsy Method: Dermablade
Anesthesia Type: 1% lidocaine with epinephrine
Hemostasis: Drysol
Wound Care: Petrolatum
Lab: [id number]
Lab Facility: 
Path Notes (To The Dermatopathologist): Please check margins.
Render Path Notes In Note?: No
Medical Necessity Clause: This procedure was medically necessary because the lesion that was treated was:
Consent was obtained from the patient. The risks and benefits to therapy were discussed in detail. Specifically, the risks of infection, scarring, bleeding, prolonged wound healing, incomplete removal, allergy to anesthesia, nerve injury and recurrence were addressed. Prior to the procedure, the treatment site was clearly identified and confirmed by the patient. All components of Universal Protocol/PAUSE Rule completed.
Post-Care Instructions: I reviewed with the patient in detail post-care instructions. Patient is to keep the biopsy site dry overnight, and then apply bacitracin twice daily until healed. Patient may apply hydrogen peroxide soaks to remove any crusting.
Notification Instructions: Patient will be notified of biopsy results. However, patient instructed to call the office if not contacted within 2 weeks.
Billing Type: Third-Party Bill

## 2025-03-05 ENCOUNTER — GYNECOLOGY VISIT (OUTPATIENT)
Dept: OBGYN | Facility: CLINIC | Age: 45
End: 2025-03-05
Payer: COMMERCIAL

## 2025-03-05 DIAGNOSIS — Z30.430 ENCOUNTER FOR IUD INSERTION: ICD-10-CM

## 2025-03-05 LAB
POCT INT CON NEG: NEGATIVE
POCT INT CON POS: POSITIVE
POCT URINE PREGNANCY TEST: NEGATIVE

## 2025-03-05 PROCEDURE — 81025 URINE PREGNANCY TEST: CPT | Performed by: OBSTETRICS & GYNECOLOGY

## 2025-03-05 PROCEDURE — 58300 INSERT INTRAUTERINE DEVICE: CPT | Performed by: OBSTETRICS & GYNECOLOGY

## 2025-03-05 NOTE — PROGRESS NOTES
44 y.o.    Female presents here today for her Mirena IUD insertion:     LMP 25      Today the patient is counseled on the risks of IUD insertion. I also discussed with the patient the risk of infection on insertion, and had asked the patient to remain on pelvic rest for one week following the insertion. We also discussed the risk of IUD expulsion, the risk of uterine perforation and IUD migration. If the IUD does migrate the patient may require a separate procedure such as a laparoscopy to retrieve the migrated IUD. I also discussed the 1% risk of pregnancy with IUD use. Also discussed with patient today increased risk of ectopic pregnancy with IUD use. Discussed specific side effects of ParaGard IUD which can be increased vaginal bleeding during menses or increased dysmenorrhea. Also discussed today the possibility that IUD may need to be removed secondary to bleeding profile or pain. Also discussed were the possibility that partner can feel the IUD during intercourse. I also discussed the side effects of Mirena which can be amenorrhea or dysfunctional uterine bleeding or spotting.  Patient had the opportunity to ask questions regarding insertion, risks and benefits, all questions are answered in their entirety.  Informed consent is signed.  She desires to have Mirena placed.     Procedure note  Urine pregnancy test is negative, informed consent was previously signed  The bimanual exam is performed the uterus is noted to be normal  in size and is mid position  A speculum was inserted into the vagina, the cervix was cleansed with Betadine swabs x3  Tenaculum was placed on the anterior lip of the cervix after injection with local anesthetic  The uterus was sounded to a depth of 8 centimeters  The Mirena IUD and sheath are inserted through the external and internal cervix os up to the level of the fundus, pulled back slightly, and deployed under sterile conditions   The strings trimmed to approximately 3  cm  Tenaculum was removed from the cervix and hemostasis was achieved with silver nitrate  The patient tolerated the procedure well    Patient is asked to followup in 4 to 6 weeks for IUD check. The patient is asked to remain on pelvic rest for two weeks. She is instructed to use a second method of contraception until follow up visit and placement confirmed. She is asked to return to office sooner as needed for heavy vaginal bleeding, uncontrolled pain, fever, or any other concerns.

## 2025-04-10 ENCOUNTER — APPOINTMENT (OUTPATIENT)
Dept: OBGYN | Facility: CLINIC | Age: 45
End: 2025-04-10
Payer: COMMERCIAL

## 2025-04-10 VITALS
WEIGHT: 159 LBS | SYSTOLIC BLOOD PRESSURE: 139 MMHG | DIASTOLIC BLOOD PRESSURE: 76 MMHG | HEIGHT: 63 IN | BODY MASS INDEX: 28.17 KG/M2

## 2025-04-10 DIAGNOSIS — Z30.011 BCP (BIRTH CONTROL PILLS) INITIATION: ICD-10-CM

## 2025-04-10 DIAGNOSIS — Z30.432 ENCOUNTER FOR IUD REMOVAL: ICD-10-CM

## 2025-04-10 PROCEDURE — 99213 OFFICE O/P EST LOW 20 MIN: CPT | Mod: 25

## 2025-04-10 PROCEDURE — 58301 REMOVE INTRAUTERINE DEVICE: CPT

## 2025-04-10 RX ORDER — NORETHINDRONE ACETATE AND ETHINYL ESTRADIOL AND FERROUS FUMARATE 1MG-20(21)
1 KIT ORAL DAILY
Qty: 84 TABLET | Refills: 3 | Status: SHIPPED | OUTPATIENT
Start: 2025-04-10

## 2025-04-10 ASSESSMENT — FIBROSIS 4 INDEX: FIB4 SCORE: 0.73

## 2025-04-10 NOTE — PROGRESS NOTES
GYN PROBLEM VISIT    CC:  IUD string check      HPI: Patient is a 44 y.o.  Patient's last menstrual period was 2025 (exact date).  in for IUD string check.   Mirena IUD was placed 2025.  Bleeding: Has been bleeding since inserted. Increases after sexual intercourse. Partner also in uncircumcised and can feel the strings prominently and they're afraid an infection can occur with him or the strings can harm him.  Pain: everyday having intense cramping. Iburpofen does help but she doesn't want to take every day.     Discussed that typically giving the body 3 months to adjust to the Mirena.   Due to multiple issues with the IUD and feeling like her body is expelling it, would like it removed and started on a low-dose birth control pill until she can meet with someone to discuss BTL or her  gets a vasectomy.     Patient also conveyed she started an estradiol patch 0.0375 mg every 3 days and progesterone capsule, 100 mg at night to help with sleep and other what she believes is perimenopausal symptoms. Her mother went through menopause at 43 yo. Shelly Harper is who prescribed the hormonal medication.    Patient does not smoke,  have hypertension, diabetes, venous and arterial thromboembolism, migraine with aura, SLE, breast cancer, or is currently postpartum.     ROS:   General: denies fever / chills  HEENT: denies sore throat:  CV: denies chest pain:  Repiratory: denies shortness of breath  GI: denies abdominal pain  : denies dysuria:    PFSH:  I personally reviewed the past medical and surgical histories.     Social History     Tobacco Use    Smoking status: Never    Smokeless tobacco: Never   Vaping Use    Vaping status: Never Used   Substance Use Topics    Alcohol use: Yes     Comment: rare    Drug use: No        Social History     Substance and Sexual Activity   Sexual Activity Yes    Partners: Male        ALLERGIES / REACTIONS:  No Known Allergies                        PHYSICAL  "EXAMINATION:  Vital Signs:   /76 (BP Location: Right arm, Patient Position: Sitting, BP Cuff Size: Adult)   Ht 5' 3\"   Wt 159 lb   LMP 2025 (Exact Date)   BMI 28.17 kg/m²     Gen: appears well, NAD  Respiratory: normal effort  Abdomen: Soft, non-tender.  Pelvic Exam:    Vulva: normal.    Urethra: normal.   Vagina: normal, no lesions.    Cervix: Strings visualized and in place 2-3 cm long. Otherwise cervix is pink and moist with no erythema.   Perineum: normal.  Chaperone Present: Sajan Capone MA    ASSESSMENT AND PLAN:  44 y.o.      Assessment & Plan  Encounter for IUD removal  See procedure note.   Orders:    Consent for all Surgical, Special Diagnostic or Therapeutic Procedures    BCP (birth control pills) initiation  Birth control options discussed in detail including pill, patch, ring, shot, implant, IUD including use and adverse effects of each option.  Discussed that OCP and ring allow for scheduled monthly periods and or manipulation of cycles if desired to skip periods.  Side effect profile for each is similar. Dicussed that progesterone only options, namely shot and implant are most associated with weight gain. Discussed abnormal bleeding associated progesterone only options, often being most common reason for stopping/removal. Patient with no history of uncontrolled HTN or DM, denies history of migraines with aura or blood clots. Discussed possible SE of any hormonal contraception may include: N/V, HA, menstrual changes, weight fluctuation, breast tenderness, abdominal pain, anxiety or depression, DVT. Counseled that contraception does not protect against STDs and condom use was recommended    - Reviewed pill initiation, importance of compliance, missed pills, common and severe side effects.     Discussed stopping estradiol patch and progesterone capsule while taking OCPs. Discussed it was okay to continue with vaginal estradiol. I will ensure with one of our physicians tomorrow " that they agree with this plan to continue the vaginal estradiol.     Orders:    norethindrone-ethinyl estradiol (LOESTRIN FE 1/20) 1-20 MG-MCG per tablet; Take 1 Tablet by mouth every day.       Follow up as soon as able for BTL consult     NHAN Vogel.  [facility]

## 2025-04-10 NOTE — PROGRESS NOTES
Pt here for string check   Mirena inserted 3/5/2025   Pelvic pain:  Last Pap : 1/22/2025 NILM - HPV

## 2025-04-11 NOTE — PROCEDURES
IUD Removal Procedure Note    Reason for removal: Bleeding, pain, cramping  The speculum was placed.  The IUD strings were visible.  The strings were grasped and gentle traction was used to remove the device.  The procedure was completed successfully.  Type of IUD removed: NAFISA Huber  4/10/2025

## 2025-06-21 ENCOUNTER — OFFICE VISIT (OUTPATIENT)
Dept: URGENT CARE | Facility: CLINIC | Age: 45
End: 2025-06-21
Payer: COMMERCIAL

## 2025-06-21 VITALS
DIASTOLIC BLOOD PRESSURE: 64 MMHG | OXYGEN SATURATION: 97 % | RESPIRATION RATE: 14 BRPM | SYSTOLIC BLOOD PRESSURE: 112 MMHG | HEIGHT: 63 IN | HEART RATE: 82 BPM | WEIGHT: 157.8 LBS | TEMPERATURE: 98.3 F | BODY MASS INDEX: 27.96 KG/M2

## 2025-06-21 DIAGNOSIS — J32.9 RHINOSINUSITIS: Primary | ICD-10-CM

## 2025-06-21 PROCEDURE — 3078F DIAST BP <80 MM HG: CPT | Performed by: PHYSICIAN ASSISTANT

## 2025-06-21 PROCEDURE — 99213 OFFICE O/P EST LOW 20 MIN: CPT | Performed by: PHYSICIAN ASSISTANT

## 2025-06-21 PROCEDURE — 3074F SYST BP LT 130 MM HG: CPT | Performed by: PHYSICIAN ASSISTANT

## 2025-06-21 RX ORDER — FLUTICASONE PROPIONATE 50 MCG
1 SPRAY, SUSPENSION (ML) NASAL DAILY
Qty: 16 G | Refills: 0 | Status: SHIPPED | OUTPATIENT
Start: 2025-06-21

## 2025-06-21 RX ORDER — ESTRADIOL 10 UG/1
10 TABLET, FILM COATED VAGINAL
COMMUNITY
Start: 2025-06-19

## 2025-06-21 ASSESSMENT — FIBROSIS 4 INDEX: FIB4 SCORE: 0.75

## 2025-06-21 NOTE — PROGRESS NOTES
Subjective:     Verbal consent was acquired by the patient to use [email or web address] ambient listening note generation during this visit     Theresa Caldwell is a 45 y.o. female who presents for No chief complaint on file.       History of Present Illness  The patient presents for evaluation of sinus pressure.    She has been experiencing significant sinus pressure since Tuesday, initially attributing it to a missed dose of her allergy medication, Zyrtec. The symptoms escalated by Thursday night, with the onset of feverish sensations and perspiration. She reports nasal discharge, which was green in color, and postnasal drainage. She attempted to alleviate the constant drip with Sudafed yesterday morning. Sneezing episodes have increased since yesterday. She also reports sinus headaches, pain behind her eyes and teeth, and a sore throat. She experienced a choking episode yesterday due to dryness, which was somewhat relieved by water intake. She recalls being exposed to wind all day yesterday, which may have exacerbated her symptoms.    She has a history of sinus infections and underwent tonsillectomy in her youth. Additionally, she has a history of full-body hives, which were treated with steroids for four years, resulting in side effects. She consulted three dermatologists and three allergists during this period. The hives were eventually resolved with immunosuppressants.    PAST SURGICAL HISTORY:  Tonsillectomy in youth          Medications:  [place] Thyroid Tabs  azithromycin Tabs  cetirizine Tabs  doxycycline Tabs  fluticasone  guaifenesin-codeine Soln  hydrocod polst-chlorphen polst Lqcr  Hydrocod Polst-CPM Polst ER Suer  Loestrin Fe 1/20 Tabs  metFORMIN ER Tb24    Allergies:             Patient has no known allergies.    Past Social Hx:  Theresa Caldwell  reports that she has never smoked. She has never used smokeless tobacco. She reports current alcohol use. She reports that she does not use drugs.            Problem list, medications, and allergies reviewed by myself today in Epic.     Objective:     There were no vitals taken for this visit.    Physical Exam  Vitals and nursing note reviewed.   Constitutional:       General: She is not in acute distress.     Appearance: Normal appearance. She is well-developed. She is not ill-appearing or toxic-appearing.   HENT:      Head: Normocephalic.      Right Ear: External ear normal. No tenderness. A middle ear effusion is present. No mastoid tenderness. Tympanic membrane is injected. Tympanic membrane is not perforated or bulging. Tympanic membrane has decreased mobility.      Left Ear: External ear normal. No tenderness. A middle ear effusion is present. No mastoid tenderness. Tympanic membrane is injected. Tympanic membrane is not perforated or bulging. Tympanic membrane has decreased mobility.      Nose: Mucosal edema, congestion and rhinorrhea present.      Right Nostril: No foreign body.      Left Nostril: No foreign body.      Right Turbinates: Swollen.      Left Turbinates: Swollen.      Right Sinus: Maxillary sinus tenderness and frontal sinus tenderness present.      Left Sinus: Maxillary sinus tenderness and frontal sinus tenderness present.      Mouth/Throat:      Mouth: Mucous membranes are moist.      Pharynx: Uvula midline. Posterior oropharyngeal erythema present. No pharyngeal swelling, oropharyngeal exudate or uvula swelling.      Tonsils: No tonsillar exudate or tonsillar abscesses.      Comments: Mild pharyngeal edema.  No tonsillar exudate.  Eyes:      Extraocular Movements: Extraocular movements intact.      Pupils: Pupils are equal, round, and reactive to light.   Cardiovascular:      Rate and Rhythm: Normal rate and regular rhythm.      Pulses: Normal pulses.      Heart sounds: Normal heart sounds. No murmur heard.  Pulmonary:      Effort: Pulmonary effort is normal. No tachypnea or respiratory distress.      Breath sounds: Normal breath sounds  and air entry. No stridor or decreased air movement. No decreased breath sounds, wheezing, rhonchi or rales.      Comments: Lungs are clear to auscultation bilaterally, no rhonchi rales or wheezes  Chest:      Chest wall: No tenderness.   Musculoskeletal:      Cervical back: Normal range of motion. No rigidity.   Lymphadenopathy:      Cervical: No cervical adenopathy.   Neurological:      Mental Status: She is alert.   Psychiatric:         Behavior: Behavior is cooperative.         Physical Exam  Mouth/Throat: Postnasal drainage observed, signs of allergies noted  Respiratory: Clear to auscultation, no wheezing, rales or rhonchi          Assessment/Plan:     Diagnosis and Associated Orders:     There are no diagnoses linked to this encounter.      Medical Decision Making:    Pleasant 45 y.o. presents to clinic with:    Assessment & Plan  Sinusitis.  - Symptoms suggest a viral etiology, potentially exacerbated by allergies.  - Increased sinus pressure, green mucus, and worsening symptoms.  - Advised to use nasal saline spray and Flonase; antibiotic prescription for Augmentin provided for use if symptoms do not improve within 7-10 days or if there is a sudden change in symptoms, conditions for use discussed.  - Over-the-counter antihistamines and decongestants like Sudafed recommended for congestion and pressure relief; return for further evaluation if symptoms worsen or do not improve.  - Vital signs stable and reassuring on today's exam    I personally reviewed prior external notes and test results pertinent to today's visit. Patient is clinically stable at today's urgent care visit.  Patient appears nontoxic with no acute distress noted. Appropriate for outpatient care at this time.  Return to clinic or go to ED if symptoms worsen or persist.  Red flag symptoms discussed.  Patient/Parent/Guardian voices understanding.   All side effects of medication discussed including allergic response, GI upset, tendon injury,  rash, sedation etc    Please note that this dictation was created using voice recognition software. I have made a reasonable attempt to correct obvious errors, but I expect that there are errors of grammar and possibly content that I did not discover before finalizing the note.    This note was electronically signed by Soila Bucio PA-C

## 2025-07-07 ENCOUNTER — HOSPITAL ENCOUNTER (OUTPATIENT)
Dept: LAB | Facility: MEDICAL CENTER | Age: 45
End: 2025-07-07
Attending: OBSTETRICS & GYNECOLOGY
Payer: COMMERCIAL

## 2025-07-07 LAB
EST. AVERAGE GLUCOSE BLD GHB EST-MCNC: 100 MG/DL
HBA1C MFR BLD: 5.1 % (ref 4–5.6)

## 2025-07-07 PROCEDURE — 84402 ASSAY OF FREE TESTOSTERONE: CPT

## 2025-07-07 PROCEDURE — 82533 TOTAL CORTISOL: CPT

## 2025-07-07 PROCEDURE — 82306 VITAMIN D 25 HYDROXY: CPT

## 2025-07-07 PROCEDURE — 83002 ASSAY OF GONADOTROPIN (LH): CPT

## 2025-07-07 PROCEDURE — 82607 VITAMIN B-12: CPT

## 2025-07-07 PROCEDURE — 82746 ASSAY OF FOLIC ACID SERUM: CPT

## 2025-07-07 PROCEDURE — 84144 ASSAY OF PROGESTERONE: CPT

## 2025-07-07 PROCEDURE — 82627 DEHYDROEPIANDROSTERONE: CPT

## 2025-07-07 PROCEDURE — 82670 ASSAY OF TOTAL ESTRADIOL: CPT

## 2025-07-07 PROCEDURE — 83001 ASSAY OF GONADOTROPIN (FSH): CPT

## 2025-07-07 PROCEDURE — 84270 ASSAY OF SEX HORMONE GLOBUL: CPT

## 2025-07-07 PROCEDURE — 83036 HEMOGLOBIN GLYCOSYLATED A1C: CPT

## 2025-07-07 PROCEDURE — 84403 ASSAY OF TOTAL TESTOSTERONE: CPT

## 2025-07-07 PROCEDURE — 83090 ASSAY OF HOMOCYSTEINE: CPT

## 2025-07-07 PROCEDURE — 82642 DIHYDROTESTOSTERONE: CPT

## 2025-07-07 PROCEDURE — 36415 COLL VENOUS BLD VENIPUNCTURE: CPT

## 2025-07-07 PROCEDURE — 80053 COMPREHEN METABOLIC PANEL: CPT

## 2025-07-07 PROCEDURE — 83525 ASSAY OF INSULIN: CPT

## 2025-07-08 LAB
25(OH)D3 SERPL-MCNC: 51 NG/ML (ref 30–100)
ALBUMIN SERPL BCP-MCNC: 4.1 G/DL (ref 3.2–4.9)
ALBUMIN/GLOB SERPL: 1.5 G/DL
ALP SERPL-CCNC: 32 U/L (ref 30–99)
ALT SERPL-CCNC: 12 U/L (ref 2–50)
ANION GAP SERPL CALC-SCNC: 12 MMOL/L (ref 7–16)
AST SERPL-CCNC: 19 U/L (ref 12–45)
BILIRUB SERPL-MCNC: 0.5 MG/DL (ref 0.1–1.5)
BUN SERPL-MCNC: 11 MG/DL (ref 8–22)
CALCIUM ALBUM COR SERPL-MCNC: 8.7 MG/DL (ref 8.5–10.5)
CALCIUM SERPL-MCNC: 8.8 MG/DL (ref 8.5–10.5)
CHLORIDE SERPL-SCNC: 107 MMOL/L (ref 96–112)
CO2 SERPL-SCNC: 19 MMOL/L (ref 20–33)
CORTIS SERPL-MCNC: 15.7 UG/DL (ref 0–23)
CREAT SERPL-MCNC: 0.96 MG/DL (ref 0.5–1.4)
DHEA-S SERPL-MCNC: 86.3 UG/DL (ref 60.9–337)
ESTRADIOL SERPL-MCNC: 120 PG/ML
FOLATE SERPL-MCNC: 16.6 NG/ML
FSH SERPL-ACNC: 5.6 MIU/ML
GFR SERPLBLD CREATININE-BSD FMLA CKD-EPI: 75 ML/MIN/1.73 M 2
GLOBULIN SER CALC-MCNC: 2.7 G/DL (ref 1.9–3.5)
GLUCOSE SERPL-MCNC: 81 MG/DL (ref 65–99)
HCYS SERPL-SCNC: 7.06 UMOL/L
LH SERPL-ACNC: 5.4 IU/L
POTASSIUM SERPL-SCNC: 4.1 MMOL/L (ref 3.6–5.5)
PROGEST SERPL-MCNC: <0.1 NG/ML
PROT SERPL-MCNC: 6.8 G/DL (ref 6–8.2)
SODIUM SERPL-SCNC: 138 MMOL/L (ref 135–145)
VIT B12 SERPL-MCNC: 460 PG/ML (ref 211–911)

## 2025-07-09 LAB — INSULIN P FAST SERPL-ACNC: 10 UIU/ML (ref 3–25)

## 2025-07-11 LAB
ANDROSTANOLONE SERPL-MCNC: 47.6 PG/ML (ref 24–208)
SHBG SERPL-SCNC: 114 NMOL/L (ref 25–122)
TESTOST FREE SERPL-MCNC: 1.2 PG/ML (ref 1.1–5.8)
TESTOST SERPL-MCNC: 17 NG/DL (ref 9–55)